# Patient Record
Sex: FEMALE | Race: WHITE | NOT HISPANIC OR LATINO | Employment: FULL TIME | URBAN - METROPOLITAN AREA
[De-identification: names, ages, dates, MRNs, and addresses within clinical notes are randomized per-mention and may not be internally consistent; named-entity substitution may affect disease eponyms.]

---

## 2018-05-07 ENCOUNTER — APPOINTMENT (OUTPATIENT)
Dept: CT IMAGING | Facility: HOSPITAL | Age: 38
End: 2018-05-07

## 2018-05-07 ENCOUNTER — HOSPITAL ENCOUNTER (EMERGENCY)
Facility: HOSPITAL | Age: 38
Discharge: HOME | End: 2018-05-07
Attending: EMERGENCY MEDICINE | Admitting: EMERGENCY MEDICINE

## 2018-05-07 VITALS
HEART RATE: 64 BPM | RESPIRATION RATE: 18 BRPM | OXYGEN SATURATION: 97 % | HEIGHT: 66 IN | SYSTOLIC BLOOD PRESSURE: 121 MMHG | DIASTOLIC BLOOD PRESSURE: 99 MMHG | WEIGHT: 200 LBS | BODY MASS INDEX: 32.14 KG/M2 | TEMPERATURE: 98.2 F

## 2018-05-07 DIAGNOSIS — R10.10 PAIN OF UPPER ABDOMEN: Primary | ICD-10-CM

## 2018-05-07 DIAGNOSIS — R10.31 RIGHT LOWER QUADRANT ABDOMINAL PAIN: ICD-10-CM

## 2018-05-07 LAB
ALBUMIN SERPL-MCNC: 4.2 G/DL (ref 3.2–4.8)
ALBUMIN/GLOB SERPL: 1.5 G/DL (ref 1.5–2.5)
ALP SERPL-CCNC: 71 U/L (ref 25–100)
ALT SERPL W P-5'-P-CCNC: 16 U/L (ref 7–40)
ANION GAP SERPL CALCULATED.3IONS-SCNC: 9 MMOL/L (ref 3–11)
AST SERPL-CCNC: 15 U/L (ref 0–33)
B-HCG UR QL: NEGATIVE
BASOPHILS # BLD AUTO: 0.04 10*3/MM3 (ref 0–0.2)
BASOPHILS NFR BLD AUTO: 0.4 % (ref 0–1)
BILIRUB SERPL-MCNC: 0.3 MG/DL (ref 0.3–1.2)
BILIRUB UR QL STRIP: NEGATIVE
BUN BLD-MCNC: 14 MG/DL (ref 9–23)
BUN/CREAT SERPL: 15.6 (ref 7–25)
CALCIUM SPEC-SCNC: 9.5 MG/DL (ref 8.7–10.4)
CHLORIDE SERPL-SCNC: 106 MMOL/L (ref 99–109)
CLARITY UR: CLEAR
CO2 SERPL-SCNC: 22 MMOL/L (ref 20–31)
COLOR UR: YELLOW
CREAT BLD-MCNC: 0.9 MG/DL (ref 0.6–1.3)
DEPRECATED RDW RBC AUTO: 44 FL (ref 37–54)
EOSINOPHIL # BLD AUTO: 0.19 10*3/MM3 (ref 0–0.3)
EOSINOPHIL NFR BLD AUTO: 2 % (ref 0–3)
ERYTHROCYTE [DISTWIDTH] IN BLOOD BY AUTOMATED COUNT: 13.6 % (ref 11.3–14.5)
GFR SERPL CREATININE-BSD FRML MDRD: 71 ML/MIN/1.73
GLOBULIN UR ELPH-MCNC: 2.8 GM/DL
GLUCOSE BLD-MCNC: 105 MG/DL (ref 70–100)
GLUCOSE UR STRIP-MCNC: NEGATIVE MG/DL
HCT VFR BLD AUTO: 38 % (ref 34.5–44)
HGB BLD-MCNC: 12.3 G/DL (ref 11.5–15.5)
HGB UR QL STRIP.AUTO: NEGATIVE
HOLD SPECIMEN: NORMAL
HOLD SPECIMEN: NORMAL
IMM GRANULOCYTES # BLD: 0.01 10*3/MM3 (ref 0–0.03)
IMM GRANULOCYTES NFR BLD: 0.1 % (ref 0–0.6)
INTERNAL NEGATIVE CONTROL: NEGATIVE
INTERNAL POSITIVE CONTROL: POSITIVE
KETONES UR QL STRIP: NEGATIVE
LEUKOCYTE ESTERASE UR QL STRIP.AUTO: NEGATIVE
LIPASE SERPL-CCNC: 38 U/L (ref 6–51)
LYMPHOCYTES # BLD AUTO: 2.25 10*3/MM3 (ref 0.6–4.8)
LYMPHOCYTES NFR BLD AUTO: 23.7 % (ref 24–44)
Lab: NORMAL
MCH RBC QN AUTO: 28.7 PG (ref 27–31)
MCHC RBC AUTO-ENTMCNC: 32.4 G/DL (ref 32–36)
MCV RBC AUTO: 88.6 FL (ref 80–99)
MONOCYTES # BLD AUTO: 0.76 10*3/MM3 (ref 0–1)
MONOCYTES NFR BLD AUTO: 8 % (ref 0–12)
NEUTROPHILS # BLD AUTO: 6.24 10*3/MM3 (ref 1.5–8.3)
NEUTROPHILS NFR BLD AUTO: 65.9 % (ref 41–71)
NITRITE UR QL STRIP: NEGATIVE
PH UR STRIP.AUTO: 5.5 [PH] (ref 5–8)
PLATELET # BLD AUTO: 318 10*3/MM3 (ref 150–450)
PMV BLD AUTO: 11.4 FL (ref 6–12)
POTASSIUM BLD-SCNC: 3.5 MMOL/L (ref 3.5–5.5)
PROT SERPL-MCNC: 7 G/DL (ref 5.7–8.2)
PROT UR QL STRIP: NEGATIVE
RBC # BLD AUTO: 4.29 10*6/MM3 (ref 3.89–5.14)
SODIUM BLD-SCNC: 137 MMOL/L (ref 132–146)
SP GR UR STRIP: 1.01 (ref 1–1.03)
UROBILINOGEN UR QL STRIP: NORMAL
WBC NRBC COR # BLD: 9.48 10*3/MM3 (ref 3.5–10.8)
WHOLE BLOOD HOLD SPECIMEN: NORMAL
WHOLE BLOOD HOLD SPECIMEN: NORMAL

## 2018-05-07 PROCEDURE — 74177 CT ABD & PELVIS W/CONTRAST: CPT

## 2018-05-07 PROCEDURE — 80306 DRUG TEST PRSMV INSTRMNT: CPT | Performed by: INTERNAL MEDICINE

## 2018-05-07 PROCEDURE — 96374 THER/PROPH/DIAG INJ IV PUSH: CPT

## 2018-05-07 PROCEDURE — 81003 URINALYSIS AUTO W/O SCOPE: CPT | Performed by: EMERGENCY MEDICINE

## 2018-05-07 PROCEDURE — 96361 HYDRATE IV INFUSION ADD-ON: CPT

## 2018-05-07 PROCEDURE — 83690 ASSAY OF LIPASE: CPT | Performed by: EMERGENCY MEDICINE

## 2018-05-07 PROCEDURE — 96376 TX/PRO/DX INJ SAME DRUG ADON: CPT

## 2018-05-07 PROCEDURE — 99284 EMERGENCY DEPT VISIT MOD MDM: CPT

## 2018-05-07 PROCEDURE — 80053 COMPREHEN METABOLIC PANEL: CPT | Performed by: EMERGENCY MEDICINE

## 2018-05-07 PROCEDURE — 96375 TX/PRO/DX INJ NEW DRUG ADDON: CPT

## 2018-05-07 PROCEDURE — 25010000002 IOPAMIDOL 61 % SOLUTION: Performed by: EMERGENCY MEDICINE

## 2018-05-07 PROCEDURE — 25010000002 FENTANYL CITRATE (PF) 100 MCG/2ML SOLUTION: Performed by: EMERGENCY MEDICINE

## 2018-05-07 PROCEDURE — 25010000002 ONDANSETRON PER 1 MG: Performed by: EMERGENCY MEDICINE

## 2018-05-07 PROCEDURE — 85025 COMPLETE CBC W/AUTO DIFF WBC: CPT | Performed by: EMERGENCY MEDICINE

## 2018-05-07 RX ORDER — OMEPRAZOLE 40 MG/1
40 CAPSULE, DELAYED RELEASE ORAL DAILY
Qty: 25 CAPSULE | Refills: 0 | Status: SHIPPED | OUTPATIENT
Start: 2018-05-07 | End: 2018-05-12 | Stop reason: HOSPADM

## 2018-05-07 RX ORDER — ONDANSETRON 4 MG/1
4 TABLET, FILM COATED ORAL EVERY 6 HOURS PRN
Qty: 8 TABLET | Refills: 0 | Status: SHIPPED | OUTPATIENT
Start: 2018-05-07 | End: 2018-05-16 | Stop reason: SDUPTHER

## 2018-05-07 RX ORDER — ALUMINA, MAGNESIA, AND SIMETHICONE 2400; 2400; 240 MG/30ML; MG/30ML; MG/30ML
15 SUSPENSION ORAL ONCE
Status: COMPLETED | OUTPATIENT
Start: 2018-05-07 | End: 2018-05-07

## 2018-05-07 RX ORDER — FENTANYL CITRATE 50 UG/ML
50 INJECTION, SOLUTION INTRAMUSCULAR; INTRAVENOUS ONCE
Status: COMPLETED | OUTPATIENT
Start: 2018-05-07 | End: 2018-05-07

## 2018-05-07 RX ORDER — FAMOTIDINE 40 MG/1
40 TABLET, FILM COATED ORAL DAILY
Qty: 10 TABLET | Refills: 0 | Status: SHIPPED | OUTPATIENT
Start: 2018-05-07 | End: 2018-05-12 | Stop reason: HOSPADM

## 2018-05-07 RX ORDER — HYDROXYZINE PAMOATE 25 MG/1
50 CAPSULE ORAL NIGHTLY
COMMUNITY

## 2018-05-07 RX ORDER — ONDANSETRON 2 MG/ML
4 INJECTION INTRAMUSCULAR; INTRAVENOUS ONCE
Status: COMPLETED | OUTPATIENT
Start: 2018-05-07 | End: 2018-05-07

## 2018-05-07 RX ORDER — TOPIRAMATE 100 MG/1
200 TABLET, FILM COATED ORAL NIGHTLY
COMMUNITY

## 2018-05-07 RX ORDER — SODIUM CHLORIDE 0.9 % (FLUSH) 0.9 %
10 SYRINGE (ML) INJECTION AS NEEDED
Status: DISCONTINUED | OUTPATIENT
Start: 2018-05-07 | End: 2018-05-07 | Stop reason: HOSPADM

## 2018-05-07 RX ORDER — SUCRALFATE 1 G/1
1 TABLET ORAL 4 TIMES DAILY
Qty: 30 TABLET | Refills: 0 | Status: SHIPPED | OUTPATIENT
Start: 2018-05-07 | End: 2018-05-12 | Stop reason: HOSPADM

## 2018-05-07 RX ADMIN — ALUMINUM HYDROXIDE, MAGNESIUM HYDROXIDE, AND DIMETHICONE 15 ML: 400; 400; 40 SUSPENSION ORAL at 18:35

## 2018-05-07 RX ADMIN — IOPAMIDOL 95 ML: 612 INJECTION, SOLUTION INTRAVENOUS at 17:40

## 2018-05-07 RX ADMIN — FENTANYL CITRATE 50 MCG: 50 INJECTION, SOLUTION INTRAMUSCULAR; INTRAVENOUS at 18:34

## 2018-05-07 RX ADMIN — SODIUM CHLORIDE 1000 ML: 9 INJECTION, SOLUTION INTRAVENOUS at 18:32

## 2018-05-07 RX ADMIN — LIDOCAINE HYDROCHLORIDE 15 ML: 20 SOLUTION ORAL; TOPICAL at 18:35

## 2018-05-07 RX ADMIN — FENTANYL CITRATE 50 MCG: 50 INJECTION, SOLUTION INTRAMUSCULAR; INTRAVENOUS at 19:45

## 2018-05-07 RX ADMIN — ONDANSETRON 4 MG: 2 INJECTION INTRAMUSCULAR; INTRAVENOUS at 18:34

## 2018-05-07 NOTE — DISCHARGE INSTRUCTIONS
Discontinue use of Ibuprofen and other NSAIDs.      Return to the Emergency Department if symptoms worsen or other concerns arise.    Please call ASAP to arrange outpatient follow up with one of the following gastroenterologists:    Jorge A Acevedo MD or Isak Perez MD  [address]  [place] [address] [phone]    Or    If unable to make a timely appointment with Dr. Acevedo or Dr. Perez, please follow up with one of these gastroenterologists:    Mario Hernandez MD or Tanner Suazo MD  [address] [facility]  [id number]  [place] [address] [phone]    If follow up with these specialists cannot be arranged soon, please also follow up with a primary care provider, next available.

## 2018-05-07 NOTE — ED PROVIDER NOTES
Subjective   Nadia Houston is a 38 y.o.female with a history of cholecystectomy who presents to the ED with c/o abdominal pain. Three days ago the patient developed nausea, which has gradually worsened since onset. Two days ago she developed diffuse right sided abdominal pain. The pain has remained on her right side and is greatly exacerbated by eating, deep inspiration or movement. Today it is more severe, prompting her to present to the ED for evaluation. At the ED she denies vomiting, fever, diarrhea or any other acute symptoms.        History provided by:  Patient  Abdominal Pain   Pain location:  RUQ  Pain radiates to:  Does not radiate  Onset quality:  Gradual  Duration:  3 days  Timing:  Constant  Progression:  Worsening  Chronicity:  New  Relieved by:  None tried  Worsened by:  Eating, movement and deep breathing  Ineffective treatments:  None tried  Associated symptoms: nausea    Associated symptoms: no chest pain, no chills, no constipation, no cough, no diarrhea, no dysuria, no fever, no shortness of breath and no vomiting        Review of Systems   Constitutional: Negative for chills and fever.   Respiratory: Negative for cough and shortness of breath.    Cardiovascular: Negative for chest pain.   Gastrointestinal: Positive for abdominal pain and nausea. Negative for constipation, diarrhea and vomiting.   Genitourinary: Negative for dysuria.   All other systems reviewed and are negative.      Past Medical History:   Diagnosis Date   • Migraine        No Known Allergies    Past Surgical History:   Procedure Laterality Date   •  SECTION     • CHOLECYSTECTOMY     • KNEE SURGERY Left        History reviewed. No pertinent family history.    Social History     Social History   • Marital status: Single     Social History Main Topics   • Smoking status: Never Smoker   • Smokeless tobacco: Never Used   • Alcohol use Yes      Comment: socially   • Drug use: No   • Sexual activity: Defer     Other  Topics Concern   • Not on file         Objective   Physical Exam   Constitutional: She is oriented to person, place, and time. She appears well-developed and well-nourished. No distress.   HENT:   Head: Normocephalic and atraumatic.   Mouth/Throat: No oropharyngeal exudate.   Eyes: Conjunctivae are normal. No scleral icterus.   Neck: Normal range of motion. Neck supple. No JVD present.   Cardiovascular: Normal rate, regular rhythm and normal heart sounds.  Exam reveals no gallop and no friction rub.    No murmur heard.  Pulmonary/Chest: Effort normal and breath sounds normal. No respiratory distress. She has no wheezes. She has no rales.   Abdominal: Soft. Bowel sounds are normal. She exhibits no distension. There is tenderness. There is no rebound and no guarding.   Moderate RLQ TTP. Minimal epigastric TTP.   Musculoskeletal: Normal range of motion. She exhibits no edema.   Neurological: She is alert and oriented to person, place, and time.   Skin: Skin is warm and dry. She is not diaphoretic.   Psychiatric: She has a normal mood and affect. Her behavior is normal.   Nursing note and vitals reviewed.      Procedures         ED Course  ED Course   Comment By Time   Dr. Levi re-evaluated the patient. She is feeling much improved. Will refer her to GI for possible peptic ulcer versus gastritis. Sourav Allyn 05/07 1843     Recent Results (from the past 24 hour(s))   CBC Auto Differential    Collection Time: 05/07/18  5:02 PM   Result Value Ref Range    WBC 9.48 3.50 - 10.80 10*3/mm3    RBC 4.29 3.89 - 5.14 10*6/mm3    Hemoglobin 12.3 11.5 - 15.5 g/dL    Hematocrit 38.0 34.5 - 44.0 %    MCV 88.6 80.0 - 99.0 fL    MCH 28.7 27.0 - 31.0 pg    MCHC 32.4 32.0 - 36.0 g/dL    RDW 13.6 11.3 - 14.5 %    RDW-SD 44.0 37.0 - 54.0 fl    MPV 11.4 6.0 - 12.0 fL    Platelets 318 150 - 450 10*3/mm3    Neutrophil % 65.9 41.0 - 71.0 %    Lymphocyte % 23.7 (L) 24.0 - 44.0 %    Monocyte % 8.0 0.0 - 12.0 %    Eosinophil % 2.0 0.0 -  "3.0 %    Basophil % 0.4 0.0 - 1.0 %    Immature Grans % 0.1 0.0 - 0.6 %    Neutrophils, Absolute 6.24 1.50 - 8.30 10*3/mm3    Lymphocytes, Absolute 2.25 0.60 - 4.80 10*3/mm3    Monocytes, Absolute 0.76 0.00 - 1.00 10*3/mm3    Eosinophils, Absolute 0.19 0.00 - 0.30 10*3/mm3    Basophils, Absolute 0.04 0.00 - 0.20 10*3/mm3    Immature Grans, Absolute 0.01 0.00 - 0.03 10*3/mm3   Urinalysis With / Culture If Indicated - Urine, Clean Catch    Collection Time: 05/07/18  5:04 PM   Result Value Ref Range    Color, UA Yellow Yellow, Straw    Appearance, UA Clear Clear    pH, UA 5.5 5.0 - 8.0    Specific Gravity, UA 1.011 1.001 - 1.030    Glucose, UA Negative Negative    Ketones, UA Negative Negative    Bilirubin, UA Negative Negative    Blood, UA Negative Negative    Protein, UA Negative Negative    Leuk Esterase, UA Negative Negative    Nitrite, UA Negative Negative    Urobilinogen, UA 0.2 E.U./dL 0.2 - 1.0 E.U./dL   POCT pregnancy, urine    Collection Time: 05/07/18  5:08 PM   Result Value Ref Range    HCG, Urine, QL Negative Negative    Lot Number [id number]     Internal Positive Control Positive     Internal Negative Control Negative      Note: In addition to lab results from this visit, the labs listed above may include labs taken at another facility or during a different encounter within the last 24 hours. Please correlate lab times with ED admission and discharge times for further clarification of the services performed during this visit.    CT Abdomen Pelvis With Contrast    (Results Pending)     Vitals:    05/07/18 1642   BP: 139/86   BP Location: Left arm   Patient Position: Sitting   Pulse: 60   Resp: 18   Temp: 98.2 °F (36.8 °C)   TempSrc: Oral   SpO2: 100%   Weight: 90.7 kg (200 lb)   Height: 167.6 cm (66\")     Medications   sodium chloride 0.9 % flush 10 mL (not administered)   aluminum-magnesium hydroxide-simethicone (MAALOX MAX) 400-400-40 MG/5ML suspension 15 mL (not administered)   lidocaine viscous " (XYLOCAINE) 2 % mouth solution 15 mL (not administered)   fentaNYL citrate (PF) (SUBLIMAZE) injection 50 mcg (not administered)   ondansetron (ZOFRAN) injection 4 mg (not administered)   sodium chloride 0.9 % bolus 1,000 mL (not administered)   iopamidol (ISOVUE-300) 61 % injection 100 mL (95 mL Intravenous Given 5/7/18 1740)     ECG/EMG Results (last 24 hours)     ** No results found for the last 24 hours. **                    [facility]    Final diagnoses:   Pain of upper abdomen   Right lower quadrant abdominal pain       Documentation assistance provided by debi Gruber.  Information recorded by the scribe was done at my direction and has been verified and validated by me.     Sourav Gruber  05/07/18 2035       Ernie Levi DO  05/07/18 7101

## 2018-05-10 ENCOUNTER — APPOINTMENT (OUTPATIENT)
Dept: GENERAL RADIOLOGY | Facility: HOSPITAL | Age: 38
End: 2018-05-10

## 2018-05-10 ENCOUNTER — HOSPITAL ENCOUNTER (OUTPATIENT)
Facility: HOSPITAL | Age: 38
Discharge: HOME | End: 2018-05-12
Attending: INTERNAL MEDICINE | Admitting: HOSPITALIST

## 2018-05-10 ENCOUNTER — OFFICE VISIT (OUTPATIENT)
Dept: GASTROENTEROLOGY | Facility: CLINIC | Age: 38
End: 2018-05-10

## 2018-05-10 VITALS
DIASTOLIC BLOOD PRESSURE: 90 MMHG | SYSTOLIC BLOOD PRESSURE: 130 MMHG | HEIGHT: 66 IN | HEART RATE: 67 BPM | OXYGEN SATURATION: 99 % | WEIGHT: 202.4 LBS | TEMPERATURE: 99 F | BODY MASS INDEX: 32.53 KG/M2

## 2018-05-10 DIAGNOSIS — R10.11 RIGHT UPPER QUADRANT ABDOMINAL PAIN: ICD-10-CM

## 2018-05-10 DIAGNOSIS — R10.10 PAIN OF UPPER ABDOMEN: ICD-10-CM

## 2018-05-10 DIAGNOSIS — R11.0 NAUSEA: Primary | ICD-10-CM

## 2018-05-10 DIAGNOSIS — R11.2 NAUSEA & VOMITING: ICD-10-CM

## 2018-05-10 DIAGNOSIS — R11.0 NAUSEA: ICD-10-CM

## 2018-05-10 DIAGNOSIS — R10.13 EPIGASTRIC PAIN: Primary | ICD-10-CM

## 2018-05-10 PROBLEM — Z86.69 HX OF MIGRAINES: Status: ACTIVE | Noted: 2018-05-10

## 2018-05-10 PROBLEM — R10.9 ABDOMINAL PAIN: Status: ACTIVE | Noted: 2018-05-10

## 2018-05-10 LAB
ALBUMIN SERPL-MCNC: 3.9 G/DL (ref 3.2–4.8)
ALBUMIN/GLOB SERPL: 1.4 G/DL (ref 1.5–2.5)
ALP SERPL-CCNC: 63 U/L (ref 25–100)
ALT SERPL W P-5'-P-CCNC: 16 U/L (ref 7–40)
AMPHET+METHAMPHET UR QL: NEGATIVE
AMPHETAMINES UR QL: NEGATIVE
AMYLASE SERPL-CCNC: 43 U/L (ref 30–118)
ANION GAP SERPL CALCULATED.3IONS-SCNC: 10 MMOL/L (ref 3–11)
AST SERPL-CCNC: 21 U/L (ref 0–33)
BARBITURATES UR QL SCN: NEGATIVE
BASOPHILS # BLD AUTO: 0.03 10*3/MM3 (ref 0–0.2)
BASOPHILS NFR BLD AUTO: 0.3 % (ref 0–1)
BENZODIAZ UR QL SCN: NEGATIVE
BILIRUB SERPL-MCNC: 0.4 MG/DL (ref 0.3–1.2)
BUN BLD-MCNC: 10 MG/DL (ref 9–23)
BUN/CREAT SERPL: 11.1 (ref 7–25)
BUPRENORPHINE SERPL-MCNC: NEGATIVE NG/ML
CALCIUM SPEC-SCNC: 9.1 MG/DL (ref 8.7–10.4)
CANNABINOIDS SERPL QL: NEGATIVE
CHLORIDE SERPL-SCNC: 111 MMOL/L (ref 99–109)
CO2 SERPL-SCNC: 19 MMOL/L (ref 20–31)
COCAINE UR QL: NEGATIVE
CREAT BLD-MCNC: 0.9 MG/DL (ref 0.6–1.3)
D-LACTATE SERPL-SCNC: 1.2 MMOL/L (ref 0.5–2)
DEPRECATED RDW RBC AUTO: 44.5 FL (ref 37–54)
EOSINOPHIL # BLD AUTO: 0.13 10*3/MM3 (ref 0–0.3)
EOSINOPHIL NFR BLD AUTO: 1.5 % (ref 0–3)
ERYTHROCYTE [DISTWIDTH] IN BLOOD BY AUTOMATED COUNT: 13.4 % (ref 11.3–14.5)
GFR SERPL CREATININE-BSD FRML MDRD: 71 ML/MIN/1.73
GLOBULIN UR ELPH-MCNC: 2.8 GM/DL
GLUCOSE BLD-MCNC: 79 MG/DL (ref 70–100)
HCT VFR BLD AUTO: 39.5 % (ref 34.5–44)
HGB BLD-MCNC: 12.3 G/DL (ref 11.5–15.5)
IMM GRANULOCYTES # BLD: 0.01 10*3/MM3 (ref 0–0.03)
IMM GRANULOCYTES NFR BLD: 0.1 % (ref 0–0.6)
LIPASE SERPL-CCNC: 29 U/L (ref 6–51)
LYMPHOCYTES # BLD AUTO: 2.46 10*3/MM3 (ref 0.6–4.8)
LYMPHOCYTES NFR BLD AUTO: 27.8 % (ref 24–44)
MAGNESIUM SERPL-MCNC: 2.1 MG/DL (ref 1.3–2.7)
MCH RBC QN AUTO: 28.5 PG (ref 27–31)
MCHC RBC AUTO-ENTMCNC: 31.1 G/DL (ref 32–36)
MCV RBC AUTO: 91.4 FL (ref 80–99)
METHADONE UR QL SCN: NEGATIVE
MONOCYTES # BLD AUTO: 0.73 10*3/MM3 (ref 0–1)
MONOCYTES NFR BLD AUTO: 8.2 % (ref 0–12)
NEUTROPHILS # BLD AUTO: 5.51 10*3/MM3 (ref 1.5–8.3)
NEUTROPHILS NFR BLD AUTO: 62.2 % (ref 41–71)
OPIATES UR QL: NEGATIVE
OXYCODONE UR QL SCN: NEGATIVE
PCP UR QL SCN: NEGATIVE
PHOSPHATE SERPL-MCNC: 3.6 MG/DL (ref 2.4–5.1)
PLATELET # BLD AUTO: 281 10*3/MM3 (ref 150–450)
PMV BLD AUTO: 11.2 FL (ref 6–12)
POTASSIUM BLD-SCNC: 3.9 MMOL/L (ref 3.5–5.5)
PROPOXYPH UR QL: NEGATIVE
PROT SERPL-MCNC: 6.7 G/DL (ref 5.7–8.2)
RBC # BLD AUTO: 4.32 10*6/MM3 (ref 3.89–5.14)
SODIUM BLD-SCNC: 140 MMOL/L (ref 132–146)
TRICYCLICS UR QL SCN: NEGATIVE
WBC NRBC COR # BLD: 8.86 10*3/MM3 (ref 3.5–10.8)

## 2018-05-10 PROCEDURE — 96361 HYDRATE IV INFUSION ADD-ON: CPT

## 2018-05-10 PROCEDURE — 81025 URINE PREGNANCY TEST: CPT | Performed by: NURSE PRACTITIONER

## 2018-05-10 PROCEDURE — 74018 RADEX ABDOMEN 1 VIEW: CPT

## 2018-05-10 PROCEDURE — 99204 OFFICE O/P NEW MOD 45 MIN: CPT | Performed by: INTERNAL MEDICINE

## 2018-05-10 PROCEDURE — 82150 ASSAY OF AMYLASE: CPT | Performed by: NURSE PRACTITIONER

## 2018-05-10 PROCEDURE — 25010000002 HYDROMORPHONE PER 4 MG: Performed by: INTERNAL MEDICINE

## 2018-05-10 PROCEDURE — 83735 ASSAY OF MAGNESIUM: CPT | Performed by: INTERNAL MEDICINE

## 2018-05-10 PROCEDURE — 85025 COMPLETE CBC W/AUTO DIFF WBC: CPT | Performed by: INTERNAL MEDICINE

## 2018-05-10 PROCEDURE — 80053 COMPREHEN METABOLIC PANEL: CPT | Performed by: INTERNAL MEDICINE

## 2018-05-10 PROCEDURE — 96374 THER/PROPH/DIAG INJ IV PUSH: CPT

## 2018-05-10 PROCEDURE — 96376 TX/PRO/DX INJ SAME DRUG ADON: CPT

## 2018-05-10 PROCEDURE — 81003 URINALYSIS AUTO W/O SCOPE: CPT | Performed by: INTERNAL MEDICINE

## 2018-05-10 PROCEDURE — 83605 ASSAY OF LACTIC ACID: CPT | Performed by: INTERNAL MEDICINE

## 2018-05-10 PROCEDURE — 25810000003 SODIUM CHLORIDE 0.9 % WITH KCL 20 MEQ 20-0.9 MEQ/L-% SOLUTION: Performed by: NURSE PRACTITIONER

## 2018-05-10 PROCEDURE — 25010000002 MORPHINE SULFATE (PF) 2 MG/ML SOLUTION: Performed by: INTERNAL MEDICINE

## 2018-05-10 PROCEDURE — 25010000002 ONDANSETRON PER 1 MG: Performed by: NURSE PRACTITIONER

## 2018-05-10 PROCEDURE — 25010000002 MORPHINE PER 10 MG: Performed by: INTERNAL MEDICINE

## 2018-05-10 PROCEDURE — G0378 HOSPITAL OBSERVATION PER HR: HCPCS

## 2018-05-10 PROCEDURE — 99220 PR INITIAL OBSERVATION CARE/DAY 70 MINUTES: CPT | Performed by: INTERNAL MEDICINE

## 2018-05-10 PROCEDURE — 84100 ASSAY OF PHOSPHORUS: CPT | Performed by: INTERNAL MEDICINE

## 2018-05-10 PROCEDURE — 83690 ASSAY OF LIPASE: CPT | Performed by: NURSE PRACTITIONER

## 2018-05-10 PROCEDURE — 96375 TX/PRO/DX INJ NEW DRUG ADDON: CPT

## 2018-05-10 PROCEDURE — G0379 DIRECT REFER HOSPITAL OBSERV: HCPCS

## 2018-05-10 RX ORDER — PANTOPRAZOLE SODIUM 40 MG/1
40 TABLET, DELAYED RELEASE ORAL
Status: DISCONTINUED | OUTPATIENT
Start: 2018-05-10 | End: 2018-05-10

## 2018-05-10 RX ORDER — SODIUM CHLORIDE AND POTASSIUM CHLORIDE 150; 900 MG/100ML; MG/100ML
100 INJECTION, SOLUTION INTRAVENOUS CONTINUOUS
Status: DISCONTINUED | OUTPATIENT
Start: 2018-05-10 | End: 2018-05-10

## 2018-05-10 RX ORDER — ALUMINA, MAGNESIA, AND SIMETHICONE 2400; 2400; 240 MG/30ML; MG/30ML; MG/30ML
15 SUSPENSION ORAL EVERY 6 HOURS PRN
Status: DISCONTINUED | OUTPATIENT
Start: 2018-05-10 | End: 2018-05-12 | Stop reason: HOSPADM

## 2018-05-10 RX ORDER — HYDROCODONE BITARTRATE AND ACETAMINOPHEN 5; 325 MG/1; MG/1
1 TABLET ORAL EVERY 6 HOURS PRN
Status: DISCONTINUED | OUTPATIENT
Start: 2018-05-10 | End: 2018-05-11

## 2018-05-10 RX ORDER — MORPHINE SULFATE 2 MG/ML
1 INJECTION, SOLUTION INTRAMUSCULAR; INTRAVENOUS
Status: DISCONTINUED | OUTPATIENT
Start: 2018-05-10 | End: 2018-05-11

## 2018-05-10 RX ORDER — HYDROMORPHONE HYDROCHLORIDE 1 MG/ML
0.5 INJECTION, SOLUTION INTRAMUSCULAR; INTRAVENOUS; SUBCUTANEOUS
Status: DISCONTINUED | OUTPATIENT
Start: 2018-05-10 | End: 2018-05-10

## 2018-05-10 RX ORDER — PANTOPRAZOLE SODIUM 40 MG/1
40 TABLET, DELAYED RELEASE ORAL 2 TIMES DAILY
Status: DISCONTINUED | OUTPATIENT
Start: 2018-05-10 | End: 2018-05-12

## 2018-05-10 RX ORDER — ONDANSETRON 4 MG/1
4 TABLET, FILM COATED ORAL EVERY 6 HOURS PRN
Status: DISCONTINUED | OUTPATIENT
Start: 2018-05-10 | End: 2018-05-12 | Stop reason: HOSPADM

## 2018-05-10 RX ORDER — ACETAMINOPHEN 325 MG/1
650 TABLET ORAL EVERY 4 HOURS PRN
Status: DISCONTINUED | OUTPATIENT
Start: 2018-05-10 | End: 2018-05-12 | Stop reason: HOSPADM

## 2018-05-10 RX ORDER — SODIUM CHLORIDE 0.9 % (FLUSH) 0.9 %
1-10 SYRINGE (ML) INJECTION AS NEEDED
Status: DISCONTINUED | OUTPATIENT
Start: 2018-05-10 | End: 2018-05-12 | Stop reason: HOSPADM

## 2018-05-10 RX ORDER — ONDANSETRON 2 MG/ML
4 INJECTION INTRAMUSCULAR; INTRAVENOUS EVERY 6 HOURS PRN
Status: DISCONTINUED | OUTPATIENT
Start: 2018-05-10 | End: 2018-05-12 | Stop reason: HOSPADM

## 2018-05-10 RX ORDER — SUCRALFATE 1 G/1
1 TABLET ORAL 4 TIMES DAILY
Status: DISCONTINUED | OUTPATIENT
Start: 2018-05-10 | End: 2018-05-11

## 2018-05-10 RX ORDER — FAMOTIDINE 20 MG/1
20 TABLET, FILM COATED ORAL 2 TIMES DAILY
Status: DISCONTINUED | OUTPATIENT
Start: 2018-05-10 | End: 2018-05-10

## 2018-05-10 RX ORDER — TOPIRAMATE 100 MG/1
100 TABLET, FILM COATED ORAL DAILY
Status: DISCONTINUED | OUTPATIENT
Start: 2018-05-11 | End: 2018-05-12 | Stop reason: HOSPADM

## 2018-05-10 RX ADMIN — ONDANSETRON 4 MG: 2 INJECTION INTRAMUSCULAR; INTRAVENOUS at 17:49

## 2018-05-10 RX ADMIN — PANTOPRAZOLE SODIUM 40 MG: 40 TABLET, DELAYED RELEASE ORAL at 16:23

## 2018-05-10 RX ADMIN — PANTOPRAZOLE SODIUM 40 MG: 40 TABLET, DELAYED RELEASE ORAL at 21:32

## 2018-05-10 RX ADMIN — HYDROCODONE BITARTRATE AND ACETAMINOPHEN 1 TABLET: 5; 325 TABLET ORAL at 21:32

## 2018-05-10 RX ADMIN — POTASSIUM CHLORIDE AND SODIUM CHLORIDE 100 ML/HR: 900; 150 INJECTION, SOLUTION INTRAVENOUS at 16:24

## 2018-05-10 RX ADMIN — ONDANSETRON 4 MG: 2 INJECTION INTRAMUSCULAR; INTRAVENOUS at 23:47

## 2018-05-10 RX ADMIN — HYDROMORPHONE HYDROCHLORIDE 0.5 MG: 1 INJECTION, SOLUTION INTRAMUSCULAR; INTRAVENOUS; SUBCUTANEOUS at 16:24

## 2018-05-10 RX ADMIN — MORPHINE SULFATE 1 MG: 10 INJECTION INTRAVENOUS at 21:07

## 2018-05-10 RX ADMIN — SUCRALFATE 1 G: 1 TABLET ORAL at 21:32

## 2018-05-10 RX ADMIN — MORPHINE SULFATE 1 MG: 10 INJECTION INTRAVENOUS at 18:17

## 2018-05-10 NOTE — PROGRESS NOTES
PCP: No Known Provider    Chief Complaint   Patient presents with   • Bleeding Ulcers       History of Present Illness:   HPI   Ms. Houston is a 38-year-old with a history of migraine headaches previous cholecystectomy.  The patient was in the emergency room on Monday with abdominal pain and nausea.  She was given a prescription for Prilosec, Pepcid and Zofran but there has been no relief.  She states the nausea is more intense and is present for most of the day.  The patient has severe upper center abdominal pain that also involves the right upper quadrant.  She states the pain is stabbing but is sometimes crampy.  There is increased intensity of the discomfort when she is mobile.  The patient denies any fever or chills.  Ms. Houston states that she has not consumed any solid food in several days due to the nausea and pain.  The patient also states her liquid intake has been very limited.  She did have one formed bowel movement the other day and this was without blood.  She does not take nonsteroidal medications on a regular basis.  She had a cholecystectomy years ago and did have gallstones.  She states this discomfort is more intense than the pain she had at the time of the gallbladder problem.  Ms. Houston denies any recent weight loss.  There is no history of travel outside the [place] or camping.  The patient is a  but is unaware of any known ill contacts.  Past Medical History:   Diagnosis Date   • Cholelithiasis    • Migraine        Past Surgical History:   Procedure Laterality Date   •  SECTION     • CHOLECYSTECTOMY     • KNEE SURGERY Left    • TONSILLECTOMY AND ADENOIDECTOMY           Current Outpatient Prescriptions:   •  famotidine (PEPCID) 40 MG tablet, Take 1 tablet by mouth Daily., Disp: 10 tablet, Rfl: 0  •  hydrOXYzine (VISTARIL) 50 MG capsule, Take 50 mg by mouth 2 (Two) Times a Day., Disp: , Rfl:   •  omeprazole (priLOSEC) 40 MG capsule, Take 1 capsule by mouth  Daily., Disp: 25 capsule, Rfl: 0  •  ondansetron (ZOFRAN) 4 MG tablet, Take 1 tablet by mouth Every 6 (Six) Hours As Needed for Nausea or Vomiting., Disp: 8 tablet, Rfl: 0  •  sucralfate (CARAFATE) 1 g tablet, Take 1 tablet by mouth 4 (Four) Times a Day., Disp: 30 tablet, Rfl: 0  •  topiramate (TOPAMAX) 100 MG tablet, Take 100 mg by mouth Daily., Disp: , Rfl:     No Known Allergies    Family History   Problem Relation Age of Onset   • Family history unknown: Yes       Social History     Social History   • Marital status: Single     Spouse name: N/A   • Number of children: N/A   • Years of education: N/A     Occupational History   • Not on file.     Social History Main Topics   • Smoking status: Never Smoker   • Smokeless tobacco: Never Used   • Alcohol use Yes      Comment: socially   • Drug use: No   • Sexual activity: Defer     Other Topics Concern   • Not on file     Social History Narrative   • No narrative on file       Review of Systems   Constitutional: Positive for activity change and appetite change. Negative for fatigue, fever and unexpected weight change.   HENT: Positive for trouble swallowing. Negative for dental problem, hearing loss, mouth sores, postnasal drip, sneezing and voice change.    Eyes: Negative for pain, redness, itching and visual disturbance.   Respiratory: Negative for cough, choking, chest tightness, shortness of breath and wheezing.    Cardiovascular: Negative for chest pain, palpitations and leg swelling.   Gastrointestinal: Positive for abdominal pain, nausea and vomiting. Negative for abdominal distention, anal bleeding, blood in stool, constipation, diarrhea and rectal pain.   Endocrine: Negative for cold intolerance, heat intolerance, polydipsia, polyphagia and polyuria.   Genitourinary: Negative.  Negative for dysuria, enuresis, flank pain, frequency, hematuria and urgency.   Musculoskeletal: Positive for gait problem. Negative for arthralgias, back pain, joint swelling and  myalgias.   Skin: Negative for color change, pallor and rash.   Allergic/Immunologic: Negative for environmental allergies, food allergies and immunocompromised state.   Neurological: Positive for headaches. Negative for dizziness, tremors, seizures, facial asymmetry, speech difficulty and numbness.   Hematological: Negative for adenopathy.   Psychiatric/Behavioral: Negative for behavioral problems, confusion, dysphoric mood, hallucinations and self-injury.       Vitals:    05/10/18 1249   BP: 130/90   Pulse: 67   Temp: 99 °F (37.2 °C)   SpO2: 99%       Physical Exam   Constitutional: She is oriented to person, place, and time.   Uncomfortable and ill appearing   HENT:   Head: Normocephalic and atraumatic.   Mouth/Throat: Oropharynx is clear and moist. No oropharyngeal exudate.   Dry mucosa   Eyes: EOM are normal. No scleral icterus.   Neck: Neck supple. No thyromegaly present.   Cardiovascular: Normal rate, regular rhythm and normal heart sounds.  Exam reveals no gallop.    No murmur heard.  Pulmonary/Chest: Effort normal and breath sounds normal. She has no wheezes. She has no rales.   Abdominal: Soft. Bowel sounds are normal. Tenderness: epigastrium, right upper quadrant. There is guarding. There is no rebound.   Musculoskeletal: Normal range of motion. She exhibits no edema or tenderness.   Lymphadenopathy:     She has no cervical adenopathy.   Neurological: She is alert and oriented to person, place, and time. She exhibits normal muscle tone.   Skin: No rash noted. No erythema.   Psychiatric: She has a normal mood and affect. Her behavior is normal. Thought content normal.   Vitals reviewed.      Nadia was seen today for bleeding ulcers.    Diagnoses and all orders for this visit:    Epigastric pain    Right upper quadrant abdominal pain    Nausea    The etiology for the abdominal pain and nausea is unclear.  She has not improved with proton pump inhibitor and H2 blocker therapy.  The differential still  includes peptic ulcer disease, pancreatitis, common bile duct stone and bowel ischemia.      Plan: Discussed with the patient that given her lack of improvement as an outpatient will admit to the hospital for further evaluation.      I spent time with the patient discussing the plan and reason for admission.

## 2018-05-10 NOTE — H&P
"    [facility] Medicine Services  HISTORY AND PHYSICAL    Patient Name: Nadia Houston  : 1980  MRN: [id number]  Primary Care Physician: No Known Provider    Subjective   Subjective     Chief Complaint:  Abdominal pain    HPI:  Nadia Houston is a 38 y.o. female PMH migraines, cholecystectomy,  who Who presents as a direct admission from Dr. Acevedo's office due to abdominal pain.  Patient is a healthy .  On Saturday she developed nausea and abdominal pain (epigastric).  Pain continued to worsen in severity and she presented to the emergency department on Monday.  She had a CT scan of her abdomen and pelvis at that time that was normal.  Incidentally small umbilical and periumbilical hernia was noted containing mesenteric fat only.  She is given Pepcid, Protonix and Zofran and sent home.  No relief from these medications.  Pain has continued to worsen.  Pain is constant.  Worsened by swallowing liquids or food.  When she swallows she feels like there is something pushing the liquid and food back of her throat.  Denies any burning.  Reports dry heaving..  She feels like her upper portion of her abdomen is swollen and \"there is something in there that needs to come out\".  She saw Dr. Acevedo today he recommended direct admission.    Review of Systems   Gen- No fevers, chills  CV- No chest pain, palpitations  Resp- No cough, dyspnea    Otherwise 10-system ROS reviewed and is negative except as mentioned in the HPI.    Personal History     Past Medical History:   Diagnosis Date   • Cholelithiasis    • Migraine        Past Surgical History:   Procedure Laterality Date   •  SECTION     • CHOLECYSTECTOMY     • KNEE SURGERY Left    • TONSILLECTOMY AND ADENOIDECTOMY         Family History: family history includes Multiple sclerosis in her mother.     Social History:  reports that she has never smoked. She has never used smokeless tobacco. She " reports that she drinks alcohol. She reports that she does not use drugs.  Social History     Social History Narrative    Works as a  at [employer], is independent of ADL's       Medications:  Prescriptions Prior to Admission   Medication Sig Dispense Refill Last Dose   • famotidine (PEPCID) 40 MG tablet Take 1 tablet by mouth Daily. 10 tablet 0 5/10/2018 at Unknown time   • hydrOXYzine (VISTARIL) 50 MG capsule Take 100 mg by mouth Every Night.   5/9/2018 at Unknown time   • omeprazole (priLOSEC) 40 MG capsule Take 1 capsule by mouth Daily. 25 capsule 0 5/10/2018 at Unknown time   • ondansetron (ZOFRAN) 4 MG tablet Take 1 tablet by mouth Every 6 (Six) Hours As Needed for Nausea or Vomiting. 8 tablet 0 5/10/2018 at Unknown time   • sucralfate (CARAFATE) 1 g tablet Take 1 tablet by mouth 4 (Four) Times a Day. 30 tablet 0 5/10/2018 at Unknown time   • topiramate (TOPAMAX) 100 MG tablet Take 100 mg by mouth Daily.   5/10/2018 at Unknown time       No Known Allergies    Objective   Objective     Vital Signs:   Temp:  [98 °F (36.7 °C)-99 °F (37.2 °C)] 98 °F (36.7 °C)  Heart Rate:  [63-67] 67  Resp:  [18] 18  BP: (126-131)/(81-90) 131/81        Physical Exam   Constitutional: No acute distress, awake, alert  Eyes: PERRLA, sclerae anicteric, no conjunctival injection  HENT: NCAT, mucous membranes dry  Neck: Supple, trachea midline  Respiratory: Clear to auscultation bilaterally, nonlabored respirations   Cardiovascular: RRR, no murmurs, rubs, or gallops, palpable pedal pulses bilaterally  Gastrointestinal: + bowel sounds, + guarding,   Musculoskeletal: No bilateral ankle edema, no clubbing or cyanosis to extremities  Psychiatric: Appropriate affect, cooperative  Neurologic: Oriented x 3, strength symmetric in all extremities, speech clear  Skin: No rashes    Results Reviewed:  Labs and imagining from 5/7 reviewed         Assessment/Plan   Assessment / Plan     Hospital Problem List     Abdominal  pain    Hx of migraines          Assessment & Plan:  --stat labs, KUB, GI consult, sounds like she needs an EGD  --IVF, pain medication, H2 blocker, PPI, Maalox prn,  --clears for now, NPO after MN in anticipation of EGD    DVT prophylaxis: SCDS/TEDS    CODE STATUS:  Full Code    Admission Status:  I believe this patient meets OBSERVATION status, however if further evaluation or treatment plans warrant, status may change.  Based upon current information, I predict patient's care encounter to be less than or equal to 2 midnights.      Electronically signed by SAVAGE Avila, 05/10/18, 3:34 PM.  Patient seen and examined at the bedside.  Patient is a 38-year-old  female with past mental history significant for acid reflux, migraine headache.  Patient recently has had abdominal pain and was seen on the seventh of this month at the emergency room.  A CT scan of the abdomen and pelvis was done which was basically nonrevealing.  Patient was sent home with Zofran, Protonix and sulfate.  Patient also was given a follow-up appointment with Dr. Acevedo.  Patient was seen today at the office of Dr. Acevedo and he recommended direct admission as patient had complaint of abdominal pain.  On physical exam patient was resting in bed in no acute distress complaining of pain.  She had been given Dilaudid 1 mg once but she was asking for more IV pain medication.  Patient also was given a shot of morphine but she told the nurse that the pain did not touch her.  Pupils are equally reactive to light and accommodation.  Neck was supple and there was no lymphadenopathy present.  Bleeding was normal and lungs are clear to auscultation bilaterally.  S1 and S2 are present and no murmur gallop or rub audible.  Abdomen was soft, nontender, not distended, bowel sounds are present.  No edema lower extremities are no jaundice.  Present.    Assessment and plan:  * Abdominal pain.  Etiology is uncertain CT scan is nonrevealing.   Patient asked for pain medication very frequently which is out of proportion with the findings on physical exam in my opinion.  We will admit the patient and monitor.  We will ask GI to see the patient in the morning and we recommend that an upper endoscopy be done.  I discussed the findings and plan of care in detail with the patient at the bedside.  Please see the above for more details.  Patient will be admitted for observation at this point.

## 2018-05-10 NOTE — PLAN OF CARE
Problem: Patient Care Overview  Goal: Plan of Care Review  Outcome: Ongoing (interventions implemented as appropriate)   05/10/18 1263   Coping/Psychosocial   Plan of Care Reviewed With patient   OTHER   Outcome Summary VSS, complaints of abdominal pain and decreased appetite, IV pain meds and fluids initiated.

## 2018-05-11 ENCOUNTER — ANESTHESIA EVENT (OUTPATIENT)
Dept: GASTROENTEROLOGY | Facility: HOSPITAL | Age: 38
End: 2018-05-11

## 2018-05-11 ENCOUNTER — ANESTHESIA (OUTPATIENT)
Dept: GASTROENTEROLOGY | Facility: HOSPITAL | Age: 38
End: 2018-05-11

## 2018-05-11 LAB
ALBUMIN SERPL-MCNC: 3.7 G/DL (ref 3.2–4.8)
ALBUMIN/GLOB SERPL: 1.5 G/DL (ref 1.5–2.5)
ALP SERPL-CCNC: 60 U/L (ref 25–100)
ALT SERPL W P-5'-P-CCNC: 16 U/L (ref 7–40)
ANION GAP SERPL CALCULATED.3IONS-SCNC: 4 MMOL/L (ref 3–11)
AST SERPL-CCNC: 17 U/L (ref 0–33)
B-HCG UR QL: NEGATIVE
BASOPHILS # BLD AUTO: 0.02 10*3/MM3 (ref 0–0.2)
BASOPHILS NFR BLD AUTO: 0.3 % (ref 0–1)
BILIRUB SERPL-MCNC: 0.5 MG/DL (ref 0.3–1.2)
BILIRUB UR QL STRIP: NEGATIVE
BUN BLD-MCNC: 10 MG/DL (ref 9–23)
BUN/CREAT SERPL: 12.5 (ref 7–25)
CALCIUM SPEC-SCNC: 8.6 MG/DL (ref 8.7–10.4)
CHLORIDE SERPL-SCNC: 113 MMOL/L (ref 99–109)
CLARITY UR: CLEAR
CO2 SERPL-SCNC: 23 MMOL/L (ref 20–31)
COLOR UR: YELLOW
CREAT BLD-MCNC: 0.8 MG/DL (ref 0.6–1.3)
DEPRECATED RDW RBC AUTO: 43.3 FL (ref 37–54)
EOSINOPHIL # BLD AUTO: 0.23 10*3/MM3 (ref 0–0.3)
EOSINOPHIL NFR BLD AUTO: 3.9 % (ref 0–3)
ERYTHROCYTE [DISTWIDTH] IN BLOOD BY AUTOMATED COUNT: 13.1 % (ref 11.3–14.5)
GFR SERPL CREATININE-BSD FRML MDRD: 81 ML/MIN/1.73
GLOBULIN UR ELPH-MCNC: 2.4 GM/DL
GLUCOSE BLD-MCNC: 76 MG/DL (ref 70–100)
GLUCOSE UR STRIP-MCNC: NEGATIVE MG/DL
HCT VFR BLD AUTO: 36.3 % (ref 34.5–44)
HGB BLD-MCNC: 11.5 G/DL (ref 11.5–15.5)
HGB UR QL STRIP.AUTO: NEGATIVE
IMM GRANULOCYTES # BLD: 0.01 10*3/MM3 (ref 0–0.03)
IMM GRANULOCYTES NFR BLD: 0.2 % (ref 0–0.6)
KETONES UR QL STRIP: ABNORMAL
LEUKOCYTE ESTERASE UR QL STRIP.AUTO: NEGATIVE
LYMPHOCYTES # BLD AUTO: 2.25 10*3/MM3 (ref 0.6–4.8)
LYMPHOCYTES NFR BLD AUTO: 38.4 % (ref 24–44)
MCH RBC QN AUTO: 28.5 PG (ref 27–31)
MCHC RBC AUTO-ENTMCNC: 31.7 G/DL (ref 32–36)
MCV RBC AUTO: 90.1 FL (ref 80–99)
MONOCYTES # BLD AUTO: 0.65 10*3/MM3 (ref 0–1)
MONOCYTES NFR BLD AUTO: 11.1 % (ref 0–12)
NEUTROPHILS # BLD AUTO: 2.7 10*3/MM3 (ref 1.5–8.3)
NEUTROPHILS NFR BLD AUTO: 46.1 % (ref 41–71)
NITRITE UR QL STRIP: NEGATIVE
PH UR STRIP.AUTO: <=5 [PH] (ref 5–8)
PLATELET # BLD AUTO: 266 10*3/MM3 (ref 150–450)
PMV BLD AUTO: 11.4 FL (ref 6–12)
POTASSIUM BLD-SCNC: 3.5 MMOL/L (ref 3.5–5.5)
PROT SERPL-MCNC: 6.1 G/DL (ref 5.7–8.2)
PROT UR QL STRIP: NEGATIVE
RBC # BLD AUTO: 4.03 10*6/MM3 (ref 3.89–5.14)
SODIUM BLD-SCNC: 140 MMOL/L (ref 132–146)
SP GR UR STRIP: 1.01 (ref 1–1.03)
UROBILINOGEN UR QL STRIP: ABNORMAL
WBC NRBC COR # BLD: 5.86 10*3/MM3 (ref 3.5–10.8)

## 2018-05-11 PROCEDURE — 25010000002 MORPHINE SULFATE (PF) 2 MG/ML SOLUTION: Performed by: INTERNAL MEDICINE

## 2018-05-11 PROCEDURE — G0378 HOSPITAL OBSERVATION PER HR: HCPCS

## 2018-05-11 PROCEDURE — 25010000002 PROPOFOL 1000 MG/ML EMULSION: Performed by: NURSE ANESTHETIST, CERTIFIED REGISTERED

## 2018-05-11 PROCEDURE — 85025 COMPLETE CBC W/AUTO DIFF WBC: CPT | Performed by: NURSE PRACTITIONER

## 2018-05-11 PROCEDURE — 25010000002 METOCLOPRAMIDE PER 10 MG: Performed by: INTERNAL MEDICINE

## 2018-05-11 PROCEDURE — 99226 PR SBSQ OBSERVATION CARE/DAY 35 MINUTES: CPT | Performed by: HOSPITALIST

## 2018-05-11 PROCEDURE — 25010000002 ONDANSETRON PER 1 MG: Performed by: NURSE PRACTITIONER

## 2018-05-11 PROCEDURE — 25010000002 MORPHINE SULFATE (PF) 2 MG/ML SOLUTION: Performed by: HOSPITALIST

## 2018-05-11 PROCEDURE — 88305 TISSUE EXAM BY PATHOLOGIST: CPT | Performed by: INTERNAL MEDICINE

## 2018-05-11 PROCEDURE — 99244 OFF/OP CNSLTJ NEW/EST MOD 40: CPT | Performed by: NURSE PRACTITIONER

## 2018-05-11 PROCEDURE — 80053 COMPREHEN METABOLIC PANEL: CPT | Performed by: NURSE PRACTITIONER

## 2018-05-11 PROCEDURE — 96376 TX/PRO/DX INJ SAME DRUG ADON: CPT

## 2018-05-11 PROCEDURE — 25010000002 HEPARIN (PORCINE) PER 1000 UNITS: Performed by: HOSPITALIST

## 2018-05-11 PROCEDURE — 25010000002 PROPOFOL 10 MG/ML EMULSION: Performed by: NURSE ANESTHETIST, CERTIFIED REGISTERED

## 2018-05-11 RX ORDER — LIDOCAINE HYDROCHLORIDE 10 MG/ML
INJECTION, SOLUTION EPIDURAL; INFILTRATION; INTRACAUDAL; PERINEURAL AS NEEDED
Status: DISCONTINUED | OUTPATIENT
Start: 2018-05-11 | End: 2018-05-11 | Stop reason: SURG

## 2018-05-11 RX ORDER — FAMOTIDINE 20 MG/1
20 TABLET, FILM COATED ORAL ONCE
Status: DISCONTINUED | OUTPATIENT
Start: 2018-05-11 | End: 2018-05-11 | Stop reason: HOSPADM

## 2018-05-11 RX ORDER — LIDOCAINE HYDROCHLORIDE 10 MG/ML
0.5 INJECTION, SOLUTION EPIDURAL; INFILTRATION; INTRACAUDAL; PERINEURAL ONCE AS NEEDED
Status: DISCONTINUED | OUTPATIENT
Start: 2018-05-11 | End: 2018-05-11 | Stop reason: HOSPADM

## 2018-05-11 RX ORDER — HYDROXYZINE HYDROCHLORIDE 25 MG/1
100 TABLET, FILM COATED ORAL NIGHTLY
Status: DISCONTINUED | OUTPATIENT
Start: 2018-05-11 | End: 2018-05-12 | Stop reason: HOSPADM

## 2018-05-11 RX ORDER — SODIUM CHLORIDE 0.9 % (FLUSH) 0.9 %
1-10 SYRINGE (ML) INJECTION AS NEEDED
Status: DISCONTINUED | OUTPATIENT
Start: 2018-05-11 | End: 2018-05-11 | Stop reason: HOSPADM

## 2018-05-11 RX ORDER — MORPHINE SULFATE 2 MG/ML
2 INJECTION, SOLUTION INTRAMUSCULAR; INTRAVENOUS
Status: DISCONTINUED | OUTPATIENT
Start: 2018-05-11 | End: 2018-05-12

## 2018-05-11 RX ORDER — SODIUM CHLORIDE 9 MG/ML
75 INJECTION, SOLUTION INTRAVENOUS CONTINUOUS
Status: DISCONTINUED | OUTPATIENT
Start: 2018-05-11 | End: 2018-05-12 | Stop reason: HOSPADM

## 2018-05-11 RX ORDER — PROPOFOL 10 MG/ML
VIAL (ML) INTRAVENOUS AS NEEDED
Status: DISCONTINUED | OUTPATIENT
Start: 2018-05-11 | End: 2018-05-11 | Stop reason: SURG

## 2018-05-11 RX ORDER — METOCLOPRAMIDE HYDROCHLORIDE 5 MG/ML
10 INJECTION INTRAMUSCULAR; INTRAVENOUS EVERY 6 HOURS
Status: DISCONTINUED | OUTPATIENT
Start: 2018-05-11 | End: 2018-05-12 | Stop reason: HOSPADM

## 2018-05-11 RX ORDER — SODIUM CHLORIDE, SODIUM LACTATE, POTASSIUM CHLORIDE, CALCIUM CHLORIDE 600; 310; 30; 20 MG/100ML; MG/100ML; MG/100ML; MG/100ML
9 INJECTION, SOLUTION INTRAVENOUS CONTINUOUS
Status: DISCONTINUED | OUTPATIENT
Start: 2018-05-11 | End: 2018-05-11

## 2018-05-11 RX ORDER — HYDROCODONE BITARTRATE AND ACETAMINOPHEN 5; 325 MG/1; MG/1
1 TABLET ORAL EVERY 4 HOURS PRN
Status: DISCONTINUED | OUTPATIENT
Start: 2018-05-11 | End: 2018-05-12

## 2018-05-11 RX ORDER — MORPHINE SULFATE 2 MG/ML
1 INJECTION, SOLUTION INTRAMUSCULAR; INTRAVENOUS EVERY 6 HOURS PRN
Status: DISCONTINUED | OUTPATIENT
Start: 2018-05-11 | End: 2018-05-11

## 2018-05-11 RX ORDER — HEPARIN SODIUM 5000 [USP'U]/ML
5000 INJECTION, SOLUTION INTRAVENOUS; SUBCUTANEOUS EVERY 8 HOURS SCHEDULED
Status: DISCONTINUED | OUTPATIENT
Start: 2018-05-11 | End: 2018-05-12 | Stop reason: HOSPADM

## 2018-05-11 RX ADMIN — MORPHINE SULFATE 1 MG: 10 INJECTION INTRAVENOUS at 14:55

## 2018-05-11 RX ADMIN — HYDROCODONE BITARTRATE AND ACETAMINOPHEN 1 TABLET: 5; 325 TABLET ORAL at 12:51

## 2018-05-11 RX ADMIN — ONDANSETRON 4 MG: 2 INJECTION INTRAMUSCULAR; INTRAVENOUS at 08:08

## 2018-05-11 RX ADMIN — PROPOFOL 80 MG: 10 INJECTION, EMULSION INTRAVENOUS at 11:14

## 2018-05-11 RX ADMIN — HEPARIN SODIUM 5000 UNITS: 5000 INJECTION, SOLUTION INTRAVENOUS; SUBCUTANEOUS at 21:38

## 2018-05-11 RX ADMIN — MORPHINE SULFATE 1 MG: 10 INJECTION INTRAVENOUS at 06:06

## 2018-05-11 RX ADMIN — HYDROCODONE BITARTRATE AND ACETAMINOPHEN 1 TABLET: 5; 325 TABLET ORAL at 23:58

## 2018-05-11 RX ADMIN — PROPOFOL 220 MCG/KG/MIN: 10 INJECTION, EMULSION INTRAVENOUS at 11:15

## 2018-05-11 RX ADMIN — MORPHINE SULFATE 2 MG: 10 INJECTION INTRAVENOUS at 21:37

## 2018-05-11 RX ADMIN — METOCLOPRAMIDE 10 MG: 5 INJECTION, SOLUTION INTRAMUSCULAR; INTRAVENOUS at 21:39

## 2018-05-11 RX ADMIN — SUCRALFATE 1 G: 1 TABLET ORAL at 08:08

## 2018-05-11 RX ADMIN — METOCLOPRAMIDE 10 MG: 5 INJECTION, SOLUTION INTRAMUSCULAR; INTRAVENOUS at 14:55

## 2018-05-11 RX ADMIN — PANTOPRAZOLE SODIUM 40 MG: 40 TABLET, DELAYED RELEASE ORAL at 08:08

## 2018-05-11 RX ADMIN — ACETAMINOPHEN 650 MG: 325 TABLET, FILM COATED ORAL at 01:19

## 2018-05-11 RX ADMIN — MORPHINE SULFATE 2 MG: 10 INJECTION INTRAVENOUS at 18:20

## 2018-05-11 RX ADMIN — ONDANSETRON 4 MG: 2 INJECTION INTRAMUSCULAR; INTRAVENOUS at 14:55

## 2018-05-11 RX ADMIN — PANTOPRAZOLE SODIUM 40 MG: 40 TABLET, DELAYED RELEASE ORAL at 21:38

## 2018-05-11 RX ADMIN — SODIUM CHLORIDE, POTASSIUM CHLORIDE, SODIUM LACTATE AND CALCIUM CHLORIDE: 600; 310; 30; 20 INJECTION, SOLUTION INTRAVENOUS at 11:13

## 2018-05-11 RX ADMIN — SODIUM CHLORIDE, POTASSIUM CHLORIDE, SODIUM LACTATE AND CALCIUM CHLORIDE 9 ML/HR: 600; 310; 30; 20 INJECTION, SOLUTION INTRAVENOUS at 10:23

## 2018-05-11 RX ADMIN — HYDROXYZINE HYDROCHLORIDE 100 MG: 25 TABLET, FILM COATED ORAL at 21:39

## 2018-05-11 RX ADMIN — TOPIRAMATE 100 MG: 100 TABLET, FILM COATED ORAL at 08:08

## 2018-05-11 RX ADMIN — HYDROCODONE BITARTRATE AND ACETAMINOPHEN 1 TABLET: 5; 325 TABLET ORAL at 08:08

## 2018-05-11 RX ADMIN — ONDANSETRON 4 MG: 2 INJECTION INTRAMUSCULAR; INTRAVENOUS at 22:34

## 2018-05-11 RX ADMIN — LIDOCAINE HYDROCHLORIDE 100 MG: 10 INJECTION, SOLUTION EPIDURAL; INFILTRATION; INTRACAUDAL; PERINEURAL at 11:14

## 2018-05-11 RX ADMIN — HYDROCODONE BITARTRATE AND ACETAMINOPHEN 1 TABLET: 5; 325 TABLET ORAL at 02:36

## 2018-05-11 RX ADMIN — SODIUM CHLORIDE 75 ML/HR: 9 INJECTION, SOLUTION INTRAVENOUS at 18:20

## 2018-05-11 RX ADMIN — MORPHINE SULFATE 1 MG: 10 INJECTION INTRAVENOUS at 00:22

## 2018-05-11 NOTE — BRIEF OP NOTE
ESOPHAGOGASTRODUODENOSCOPY  Progress Note    Nadia Correiatcher  5/10/2018 - 5/11/2018    EGD shows erosive gastritis in antrum. There are several white pills in the stomach. Poor gastric motility is noted.    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    >> Await H pylori biopsies.  >> Daily PPI  >> Trial Reglan Mark I. Brunner, MD     Date: 5/11/2018  Time: 11:31 AM

## 2018-05-11 NOTE — ANESTHESIA POSTPROCEDURE EVALUATION
Patient: Nadia Houston    Procedure Summary     Date:  05/11/18 Room / Location:   [facility]    Anesthesia Start:  1114 Anesthesia Stop:      Procedure:  ESOPHAGOGASTRODUODENOSCOPY (N/A Esophagus) Diagnosis:      Surgeon:  Mark I Brunner, MD Provider:  Jorge A Mcneal MD    Anesthesia Type:  general ASA Status:  2          Anesthesia Type: general  Last vitals  /74   Temp 97   Pulse 65   Resp 20   SpO2 96     Post Anesthesia Care and Evaluation    Patient location during evaluation: PACU  Patient participation: complete - patient participated  Level of consciousness: awake and alert  Pain score: 0  Pain management: adequate  Airway patency: patent  Anesthetic complications: No anesthetic complications  PONV Status: none  Cardiovascular status: hemodynamically stable and acceptable  Respiratory status: nonlabored ventilation, acceptable and nasal cannula  Hydration status: acceptable

## 2018-05-11 NOTE — ANESTHESIA PREPROCEDURE EVALUATION
Anesthesia Evaluation     Patient summary reviewed and Nursing notes reviewed   no history of anesthetic complications:  NPO Solid Status: > 8 hours  NPO Liquid Status: > 8 hours           Airway   Mallampati: II  TM distance: >3 FB  Neck ROM: full  No difficulty expected  Dental - normal exam     Pulmonary - negative pulmonary ROS and normal exam    breath sounds clear to auscultation  Cardiovascular - negative cardio ROS and normal exam    Rhythm: regular  Rate: normal        Neuro/Psych- negative ROS  GI/Hepatic/Renal/Endo    (+) obesity,       ROS Comment: Epigastric pain    Musculoskeletal (-) negative ROS    Abdominal    Substance History - negative use     OB/GYN          Other - negative ROS                       Anesthesia Plan    ASA 2     general     intravenous induction   Anesthetic plan and risks discussed with patient.    Plan discussed with CRNA.

## 2018-05-11 NOTE — PROGRESS NOTES
[facility] Medicine Services  PROGRESS NOTE    Patient Name: Nadia Houston  : 1980  MRN: [id number]    Date of Admission: 5/10/2018  Length of Stay: 0  Primary Care Physician: No Known Provider    Subjective   Subjective     CC:  Direct admission from GI clinic for abdominal pain    HPI:  Seems to feel the pain medication is not strong enough.  No family.  Says she thinks this is gastroparesis.  Had EGD today.    Review of Systems  Gen- No fevers, chills  CV- No chest pain, palpitations  Resp- No cough, dyspnea  GI- No N/V/D, abd pain    Otherwise ROS is negative except as mentioned in the HPI.    Objective   Objective     Vital Signs:   Temp:  [97.4 °F (36.3 °C)-98.3 °F (36.8 °C)] 98.3 °F (36.8 °C)  Heart Rate:  [56-80] 60  Resp:  [17-22] 18  BP: (103-128)/(61-84) 123/74     Physical Exam:    Constitutional: No acute distress, awake, alert  HENT: NCAT, dry tongue  Respiratory: Clear to auscultation bilaterally, respiratory effort normal   Cardiovascular: RRR, no murmurs, rubs, or gallops, palpable pedal pulses bilaterally  Gastrointestinal: Positive bowel sounds, soft, + epigastric tenderness  Musculoskeletal: No bilateral ankle edema  Psychiatric: Appropriate affect, cooperative  Neurologic: Oriented x 3, strength symmetric in all extremities, Cranial Nerves grossly intact to confrontation, speech clear  Skin: No rashes      Results Reviewed:  I have personally reviewed current lab, radiology, and data and agree.      Results from last 7 days  Lab Units 18  0559 05/10/18  1534 18  1702   WBC 10*3/mm3 5.86 8.86 9.48   HEMOGLOBIN g/dL 11.5 12.3 12.3   HEMATOCRIT % 36.3 39.5 38.0   PLATELETS 10*3/mm3 266 281 318       Results from last 7 days  Lab Units 18  0559 05/10/18  1534 18  1702   SODIUM mmol/L 140 140 137   POTASSIUM mmol/L 3.5 3.9 3.5   CHLORIDE mmol/L 113* 111* 106   CO2 mmol/L 23.0 19.0* 22.0   BUN mg/dL 10 10 14   CREATININE mg/dL 0.80 0.90  0.90   GLUCOSE mg/dL 76 79 105*   CALCIUM mg/dL 8.6* 9.1 9.5   ALT (SGPT) U/L 16 16 16   AST (SGOT) U/L 17 21 15     Estimated Creatinine Clearance: 110.3 mL/min (by C-G formula based on SCr of 0.8 mg/dL).  No results found for: BNP  No results found for: PHART    Microbiology Results Abnormal     None          Imaging Results (last 24 hours)     Procedure Component Value Units Date/Time    XR Abdomen KUB [[id number]] Collected:  05/10/18 2052     Updated:  05/11/18 0829    Narrative:       EXAMINATION: XR ABDOMEN KUB-      INDICATION: Abdominal pain.      COMPARISON: None.     FINDINGS: Mildly shaped scoliosis is noted. Stool shadow is seen  throughout normal caliber colon. Some small bowel gas is seen, but in  normal-appearing loops. No bowel wall edema, pneumatosis or pathologic  intraabdominal mass effect is seen. Clips noted in the gallbladder  fossa. A small round calculus in the pelvis, 1 to 2 mm in diameter is  probably a phlebolith, but is in the general location of the distal  ureter.        Impression:       1. Nonobstructive bowel gas pattern.  2. Probable incidental phlebolith in the right pelvis. Please correlate  with any history of flank pain however.     D:  05/10/2018  E:  05/11/2018           I have reviewed the medications.    Assessment/Plan   Assessment / Plan     Hospital Problem List     Abdominal pain    Hx of migraines        Brief Hospital Course to date:  Nadia Houston is a 38 y.o. female who was a direct admission from GI clinic with abdominal pain.    CT imaging and EGD today unclear, but possible gastroparesis.    Assessment & Plan:    Abdominal Pain  - possible gastroparesis  - gastritis  - PPI/Reglan  Sleep disorder  - hydroxyzine 100 mg at bedtime  Gastroparesis  - Reglan IV  Pain  - increase IV morphine today    Asking for increase in pain mediation  Discussed the risk of slowing GI tract while trying to give pro motility agent  Increase dose and frequency of morphine  today.  Sounds like has had sleep disorder for years.    EGD today.    DVT Prophylaxis:      CODE STATUS: Full Code    Disposition: I expect the patient to be discharged TBD      Electronically signed by Dallas Albarran MD, 05/11/18, 6:35 PM.

## 2018-05-11 NOTE — PROGRESS NOTES
Continued Stay Note  [facility]     Patient Name: Nadia Houston  MRN: [id number]  Today's Date: 5/11/2018    Admit Date: 5/10/2018          Discharge Plan     Row Name 05/11/18 1445       Plan    Plan HOME    Patient/Family in Agreement with Plan yes    Plan Comments Met with pt at bedside.  She resides with her 12 y/o son in [facility].  She is independent with ADLs.  No current DME or HH services.  Confirmed she has [employer] insurance with Rx coverage.  She confirmed she has a PCP at [facility] in [place], however, cannot recall the name of the MD.  Goal is to return home upon DC.  No immediate needs identified/voiced.  CM will cont to follow.    Final Discharge Disposition Code 01 - home or self-care              Discharge Codes    No documentation.       Expected Discharge Date and Time     Expected Discharge Date Expected Discharge Time    May 13, 2018             Ansley Barker

## 2018-05-11 NOTE — PLAN OF CARE
Problem: Patient Care Overview  Goal: Plan of Care Review  Outcome: Ongoing (interventions implemented as appropriate)   05/11/18 5192   Coping/Psychosocial   Plan of Care Reviewed With patient   OTHER   Outcome Summary EGD found significant gastroparesis, reglan started. Morphine increased.    Coping/Psychosocial   Patient Agreement with Plan of Care agrees   Plan of Care Review   Progress improving

## 2018-05-11 NOTE — PLAN OF CARE
Problem: Patient Care Overview  Goal: Plan of Care Review  Outcome: Ongoing (interventions implemented as appropriate)   05/11/18 0312   Coping/Psychosocial   Plan of Care Reviewed With patient   OTHER   Outcome Summary Pt. made NPO at midnight possible EGD in am. Pt. c/o pain working on controling pain. Pt. c/o nausea see MAR controlled w/ medication. VSS, IV fluids d/c, & urine sample sent to lab.    Coping/Psychosocial   Patient Agreement with Plan of Care agrees   Plan of Care Review   Progress improving       Problem: Pain, Acute (Adult)  Goal: Identify Related Risk Factors and Signs and Symptoms  Outcome: Ongoing (interventions implemented as appropriate)   05/11/18 0312   Pain, Acute (Adult)   Related Risk Factors (Acute Pain) persistent pain   Signs and Symptoms (Acute Pain) guarding/abnormal posturing/positioning;verbalization of pain descriptors;sleep pattern alteration;nausea/vomiting/anorexia     Goal: Acceptable Pain Control/Comfort Level  Outcome: Ongoing (interventions implemented as appropriate)   05/11/18 0312   Pain, Acute (Adult)   Acceptable Pain Control/Comfort Level making progress toward outcome

## 2018-05-11 NOTE — PROGRESS NOTES
Discharge Planning Assessment  [facility]     Patient Name: Nadia Houston  MRN: [id number]  Today's Date: 5/11/2018    Admit Date: 5/10/2018          Discharge Needs Assessment     Row Name 05/11/18 1444       Living Environment    Lives With child(yessy), dependent    Current Living Arrangements home/apartment/condo    Primary Care Provided by self    Provides Primary Care For child(yessy)    Quality of Family Relationships unable to assess    Able to Return to Prior Arrangements yes       Transition Planning    Patient/Family Anticipates Transition to home    Patient/Family Anticipated Services at Transition none    Transportation Anticipated car, drives self       Discharge Needs Assessment    Readmission Within the Last 30 Days no previous admission in last 30 days    Concerns to be Addressed no discharge needs identified;denies needs/concerns at this time    Equipment Currently Used at Home none    Anticipated Changes Related to Illness none    Equipment Needed After Discharge none            Discharge Plan     Row Name 05/11/18 1445       Plan    Plan HOME    Patient/Family in Agreement with Plan yes    Plan Comments Met with pt at bedside.  She resides with her 14 y/o son in [facility].  She is independent with ADLs.  No current DME or HH services.  Confirmed she has [employer] insurance with Rx coverage.  She confirmed she has a PCP at [facility] in [place], however, cannot recall the name of the MD.  Goal is to return home upon DC.  No immediate needs identified/voiced.  CM will cont to follow.    Final Discharge Disposition Code 01 - home or self-care        Destination     No service coordination in this encounter.      Durable Medical Equipment     No service coordination in this encounter.      Dialysis/Infusion     No service coordination in this encounter.      Home Medical Care     No service coordination in this encounter.      Social Care     No service coordination in this encounter.         Expected Discharge Date and Time     Expected Discharge Date Expected Discharge Time    May 13, 2018               Demographic Summary     Row Name 05/11/18 1443       General Information    Admission Type observation    Referral Source admission list    Reason for Consult discharge planning    Preferred Language English       Contact Information    Permission Granted to Share Info With     Contact Information Obtained for             Functional Status     Row Name 05/11/18 1444       Functional Status    Usual Activity Tolerance good       Functional Status, IADL    Medications independent    Meal Preparation independent    Housekeeping independent    Laundry independent    Shopping independent            Psychosocial    No documentation.           Abuse/Neglect    No documentation.           Legal    No documentation.           Substance Abuse    No documentation.           Patient Forms    No documentation.         Ansley Barker

## 2018-05-11 NOTE — CONSULTS
"[facility]      Name:NADIA HOUSTON : 1980 MRN# [id number]  Date of Service: 18   Admitted 5/10/2018  1:50 PM  LOS: 0 days Room S583/1  Reason for Consultation: abd pain  CC: abd pain   Assessment/Plan                                             ASSESSMENT & PLANS   38 y.o. female who is admitted on 5/10/2018 for Abdominal pain [R10.9].  Active Problems:    Abdominal pain    Hx of migraines    Upper abd pain. Unclear etiology. Labs and CT normal. Seldom NSAIDS and occasional ETOH. Obese BMI 33  Nausea w/o vomiting  Hx of ccy   · NPO. EGD  · Continue hydration, PPI, and carafate        Subjective                                                     SUBJECTIVE     HPI:Nadia Houston is a 38 y.o. female PMH migraines, cholecystectomy,  who Who presents as a direct admission from Dr. Acevedo's office due to abdominal pain.   Pt developed new onset of nausea and abdominal pain (epigastric) on Saturday.    Pain continued to worsen in severity and she presented to the emergency department on .  She had a CT scan of her abdomen and pelvis at that time that was normal.  Incidentally small umbilical and periumbilical hernia was noted containing mesenteric fat only.  She is given Pepcid, Protonix and Zofran and sent home.      No relief from these medications.  Pain has continued to worsen.  She saw Dr. Acevedo yesterday and he recommended direct admission.     Reports of severe, constant upper center (mostly) and RUQ (sometimes) abdominal pain. Does not radiate to back. Worsened by swallowing liquids or food and moving.  When she swallows she feels like there is something pushing the liquid and food back of her throat.  Denies any burning or odynophagia.  Reports dry heaving and constant nausea.  She feels like her upper portion of her abdomen is swollen and \"there is something in there that needs to come out\".  Pain is stabbing but is sometimes crampy, feeling " like labor pain. The patient denies any fever or chills.  Ms. Houston states that she has not consumed any solid food in several days due to the nausea and pain.  The patient also states her liquid intake has been very limited.  She did have one formed bowel movement 3 days ago on Tues, but no bloody or abnormal stool.  She does not take nonsteroidal medications on a regular basis.  She had a cholecystectomy years ago and did have gallstones.  She states this discomfort is more intense than the pain she had at the time of the gallbladder problem.  Ms. Houston denies any recent weight loss.  There is no history of travel outside the [place] or camping.  The patient is a  but is unaware of any known ill contacts. Seldom NSAIDS, which she takes PRN for migraines. Reports that her migraines is mostly controlled by daily Topamax.  Seldom ETOH    Work up does not show anemia or dehydration    ROS: Complete 14-system ROS performed & documented w/ pertinent findings included in HPI.  All other sys are negative.  Subjective   PAST MED HX: Pt has a past medical history of Cholelithiasis and Migraine.  PAST SURG HX: Pt has a past surgical history that includes  section; Cholecystectomy; Knee surgery (Left); and Tonsillectomy and adenoidectomy.  FAM HX: family history includes Multiple sclerosis in her mother.  SOC HX: Pt reports that she has never smoked. She has never used smokeless tobacco. She reports that she drinks alcohol about 1 or 2 a wk. She reports that she does not use drugs. She is an . No recreational drug  Objective                                                           OBJECTIVE   Allergy: Pt has No Known Allergies.  Scheduled Meds  pantoprazole 40 mg Oral BID   sucralfate 1 g Oral 4x Daily   topiramate 100 mg Oral Daily     Infusions   PRN Meds•  acetaminophen  •  aluminum-magnesium hydroxide-simethicone  •  HYDROcodone-acetaminophen  •  Morphine  •   ondansetron  •  ondansetron  •  sodium chloride  Home Meds  Prescriptions Prior to Admission   Medication Sig Dispense Refill Last Dose   • famotidine (PEPCID) 40 MG tablet Take 1 tablet by mouth Daily. 10 tablet 0 5/10/2018 at Unknown time   • hydrOXYzine (VISTARIL) 50 MG capsule Take 100 mg by mouth Every Night.   5/9/2018 at Unknown time   • omeprazole (priLOSEC) 40 MG capsule Take 1 capsule by mouth Daily. 25 capsule 0 5/10/2018 at Unknown time   • ondansetron (ZOFRAN) 4 MG tablet Take 1 tablet by mouth Every 6 (Six) Hours As Needed for Nausea or Vomiting. 8 tablet 0 5/10/2018 at Unknown time   • sucralfate (CARAFATE) 1 g tablet Take 1 tablet by mouth 4 (Four) Times a Day. 30 tablet 0 5/10/2018 at Unknown time   • topiramate (TOPAMAX) 100 MG tablet Take 100 mg by mouth Daily.   5/10/2018 at Unknown time       Results from last 7 days  Lab Units 05/11/18  0559 05/10/18  1534 05/07/18  1702   HEMOGLOBIN g/dL 11.5 12.3 12.3   HEMATOCRIT % 36.3 39.5 38.0       Results from last 7 days  Lab Units 05/11/18  0559 05/10/18  1534 05/07/18  1702   WBC 10*3/mm3 5.86 8.86 9.48   MCV fL 90.1 91.4 88.6   PLATELETS 10*3/mm3 266 281 318   BUN / CREAT RATIO  12.5 11.1 15.6   LACTATE mmol/L  --  1.2  --    ANION GAP mmol/L 4.0 10.0 9.0       Results from last 7 days  Lab Units 05/11/18  0559 05/10/18  1534 05/07/18  1702   AST (SGOT) U/L 17 21 15   ALT (SGPT) U/L 16 16 16   ALK PHOS U/L 60 63 71   BILIRUBIN mg/dL 0.5 0.4 0.3   ALBUMIN g/dL 3.70 3.90 4.20   LIPASE U/L  --  29 38   AMYLASE U/L  --  43  --    BUN mg/dL 10 10 14   CREATININE mg/dL 0.80 0.90 0.90   EGFR IF NONAFRICN AM mL/min/1.73 81 71 71   SODIUM mmol/L 140 140 137   POTASSIUM mmol/L 3.5 3.9 3.5   MAGNESIUM mg/dL  --  2.1  --    CO2 mmol/L 23.0 19.0* 22.0   PHOSPHORUS mg/dL  --  3.6  --    GLUCOSE mg/dL 76 79 105*     urine drug screen negative    Temp  Min: 97.9 °F (36.6 °C)  Max: 99 °F (37.2 °C)   BP  Min: 103/64  Max: 131/81   Pulse  Min: 56  Max: 67   Resp  Min:  17  Max: 18   SpO2  Min: 96 %  Max: 99 %   Body mass index is 32.54 kg/m².  Physical Exam  General Well developed; well nourished; no acute distress.   ENT Oral mucosa pink and moist without thrush or lesions.    Neck Neck supple; trachea midline. No thyromegaly   Resp CTA; no rhonchi, rales, or wheezes.  Respiration effort normal  CV RRR; normal S1, S2; no M/R/G. No lower extremity edema  GI Abd soft, moderately TTP in midepigastric, some guarding, no rebound, ND, normal active bowel sounds. No abd hernia  Skin No rash; no lesions; no bruises.  Skin turgor normal  Musc No clubbing; no cyanosis.  Moving all extremities  Psych Oriented to time, place, and person.  Appropriate affect    Alexia Hoffman APRN  [phone]

## 2018-05-12 VITALS
DIASTOLIC BLOOD PRESSURE: 57 MMHG | HEIGHT: 66 IN | SYSTOLIC BLOOD PRESSURE: 100 MMHG | OXYGEN SATURATION: 97 % | BODY MASS INDEX: 32.14 KG/M2 | TEMPERATURE: 98.3 F | RESPIRATION RATE: 18 BRPM | WEIGHT: 200 LBS | HEART RATE: 74 BPM

## 2018-05-12 LAB
CYTO UR: NORMAL
LAB AP CASE REPORT: NORMAL
LAB AP CLINICAL INFORMATION: NORMAL
Lab: NORMAL
PATH REPORT.FINAL DX SPEC: NORMAL
PATH REPORT.GROSS SPEC: NORMAL

## 2018-05-12 PROCEDURE — 25010000002 ONDANSETRON PER 1 MG: Performed by: NURSE PRACTITIONER

## 2018-05-12 PROCEDURE — 25010000002 HEPARIN (PORCINE) PER 1000 UNITS: Performed by: HOSPITALIST

## 2018-05-12 PROCEDURE — 99213 OFFICE O/P EST LOW 20 MIN: CPT | Performed by: INTERNAL MEDICINE

## 2018-05-12 PROCEDURE — 99217 PR OBSERVATION CARE DISCHARGE MANAGEMENT: CPT | Performed by: HOSPITALIST

## 2018-05-12 PROCEDURE — G0378 HOSPITAL OBSERVATION PER HR: HCPCS

## 2018-05-12 PROCEDURE — 25010000002 MORPHINE SULFATE (PF) 2 MG/ML SOLUTION: Performed by: HOSPITALIST

## 2018-05-12 PROCEDURE — 25010000002 METOCLOPRAMIDE PER 10 MG: Performed by: INTERNAL MEDICINE

## 2018-05-12 RX ORDER — PANTOPRAZOLE SODIUM 40 MG/1
40 TABLET, DELAYED RELEASE ORAL
Status: DISCONTINUED | OUTPATIENT
Start: 2018-05-13 | End: 2018-05-12 | Stop reason: HOSPADM

## 2018-05-12 RX ORDER — METOCLOPRAMIDE 10 MG/1
10 TABLET ORAL 3 TIMES DAILY PRN
Qty: 42 TABLET | Refills: 0 | Status: SHIPPED | OUTPATIENT
Start: 2018-05-12 | End: 2018-05-26

## 2018-05-12 RX ORDER — METOCLOPRAMIDE 10 MG/1
10 TABLET ORAL 3 TIMES DAILY PRN
Status: DISCONTINUED | OUTPATIENT
Start: 2018-05-12 | End: 2018-05-12 | Stop reason: HOSPADM

## 2018-05-12 RX ORDER — PANTOPRAZOLE SODIUM 40 MG/1
40 TABLET, DELAYED RELEASE ORAL DAILY
Qty: 30 TABLET | Refills: 0 | Status: SHIPPED | OUTPATIENT
Start: 2018-05-12 | End: 2018-06-11

## 2018-05-12 RX ADMIN — HEPARIN SODIUM 5000 UNITS: 5000 INJECTION, SOLUTION INTRAVENOUS; SUBCUTANEOUS at 06:16

## 2018-05-12 RX ADMIN — METOCLOPRAMIDE 10 MG: 5 INJECTION, SOLUTION INTRAMUSCULAR; INTRAVENOUS at 14:40

## 2018-05-12 RX ADMIN — MORPHINE SULFATE 2 MG: 10 INJECTION INTRAVENOUS at 02:55

## 2018-05-12 RX ADMIN — TOPIRAMATE 100 MG: 100 TABLET, FILM COATED ORAL at 09:24

## 2018-05-12 RX ADMIN — ONDANSETRON 4 MG: 2 INJECTION INTRAMUSCULAR; INTRAVENOUS at 09:23

## 2018-05-12 RX ADMIN — METOCLOPRAMIDE 10 MG: 5 INJECTION, SOLUTION INTRAMUSCULAR; INTRAVENOUS at 09:23

## 2018-05-12 RX ADMIN — SODIUM CHLORIDE 75 ML/HR: 9 INJECTION, SOLUTION INTRAVENOUS at 07:35

## 2018-05-12 RX ADMIN — PANTOPRAZOLE SODIUM 40 MG: 40 TABLET, DELAYED RELEASE ORAL at 09:23

## 2018-05-12 RX ADMIN — MORPHINE SULFATE 2 MG: 10 INJECTION INTRAVENOUS at 09:23

## 2018-05-12 RX ADMIN — METOCLOPRAMIDE 10 MG: 5 INJECTION, SOLUTION INTRAMUSCULAR; INTRAVENOUS at 02:15

## 2018-05-12 NOTE — PROGRESS NOTES
"Adult Nutrition  Assessment/PES    Patient Name:  Nadia Houston  YOB: 1980  MRN: [id number]  Admit Date:  5/10/2018    Assessment Date:  5/12/2018    Comments:            Adult Nutrition Assessment     Row Name 05/12/18 1442       Anthropometrics    Height Method measured    Height 167.6 cm (66\")       Admit Weight    Admit Weight Method measured    Admit Weight 90.7 kg (200 lb)       Ideal Body Weight (IBW)    Ideal Body Weight (IBW) (kg) 59.58       IBW Adjustment, Para/Tetraplegia    5% Adjustment, Para (IBW) 56.6    10% Adjustment, Para (IBW) 53.62    10% Adjustment, Tetra (IBW) 53.62    15% Adjustment, Tetra (IBW) 50.64       Labs/Procedures/Meds    Lab Results Reviewed reviewed       Nutrition Prescription PO    Current PO Diet Clear Liquid    Row Name 05/12/18 1441       Nutrition/Diet History    Typical Food/Fluid Intake Spoke w/patient at time of visit, patient stated she was still having some abdominal pain and nausea.  She has not had any of the clear liquids from her tray, but was getting ready to try a Boost Breeze.  Patient denies any recent unintentional weight loss.    Row Name 05/12/18 1440       Reason for Assessment    Reason For Assessment identified at risk by screening criteria    Diagnosis other (see comments)   abdominal pain, h/o migraines    Identified At Risk by Screening Criteria reduced oral intake over the last month;unintentional loss of 10 lbs or more in the past 2 mos          Problem/Interventions:        Problem 1     Row Name 05/12/18 1443       Nutrition Diagnoses Problem 1    Problem 1 Altered GI Function    Etiology (related to) Medical Diagnosis    Gastrointestinal Nausea;Vomiting    Signs/Symptoms (evidenced by) PO diet not tolerated                    Intervention Goal     Row Name 05/12/18 1443       Intervention Goal    General Nutrition support treatment    PO Advance diet;Tolerate PO            Nutrition Intervention     Row Name 05/12/18 1443       " Nutrition Intervention    RD/Tech Action Follow Tx progress;Care plan reviewd;Interview for preference              Education/Evaluation     Row Name 05/12/18 1447       Monitor/Evaluation    Monitor Per protocol;PO intake;Pertinent labs;Other (comment)   Monitor for advancement of po diet        Electronically signed by:  Nadia Floyd RD  05/12/18 2:43 PM   Time Spent:  20 minutes

## 2018-05-12 NOTE — PLAN OF CARE
Problem: Patient Care Overview  Goal: Plan of Care Review  Outcome: Ongoing (interventions implemented as appropriate)   05/12/18 0520   Coping/Psychosocial   Plan of Care Reviewed With patient   OTHER   Outcome Summary Pt. was up most of the night, pain controlled see MAR, one med. emesis treated see MAR, still no BM, & VSS.    Coping/Psychosocial   Patient Agreement with Plan of Care agrees   Plan of Care Review   Progress improving       Problem: Pain, Acute (Adult)  Goal: Identify Related Risk Factors and Signs and Symptoms  Outcome: Ongoing (interventions implemented as appropriate)   05/12/18 0520   Pain, Acute (Adult)   Related Risk Factors (Acute Pain) persistent pain   Signs and Symptoms (Acute Pain) verbalization of pain descriptors     Goal: Acceptable Pain Control/Comfort Level  Outcome: Ongoing (interventions implemented as appropriate)   05/12/18 0520   Pain, Acute (Adult)   Acceptable Pain Control/Comfort Level making progress toward outcome

## 2018-05-12 NOTE — DISCHARGE SUMMARY
[facility] Medicine Services  DISCHARGE SUMMARY    Patient Name: Nadia Houston  : 1980  MRN: [id number]    Date of Admission: 5/10/2018  Date of Discharge:  2018 (Saturday)  Primary Care Physician:  Kevin Morejon MD    Consults     Date and Time Order Name Status Description    5/10/2018 1525 Inpatient Gastroenterology Consult          Mark Brunner, MD - Gastroenterology    Hospital Course     Presenting Problem:   Abdominal pain [R10.9]    Active Hospital Problems (** Indicates Principal Problem)    Diagnosis Date Noted   • Abdominal pain [R10.9] 05/10/2018   • Hx of migraines [Z86.69] 05/10/2018      Resolved Hospital Problems    Diagnosis Date Noted Date Resolved   No resolved problems to display.      Nausea  Suspected Gastroparesis    Hospital Course:  Nadia Houston is a 38 y.o. female sent in as a direct admission from GI offices of Dr. Darwin Acevedo.  Patient was seen by GI by Dr. Mark Brunner and had an EGD with biopsies.  She was started on IV Reglan and a PPI.  She had mild improvement but wants to go home today to be with her 13 yr old son.      Procedure(s):  ESOPHAGOGASTRODUODENOSCOPY    1104 UPPER GI ENDOSCOPY   Day of Discharge     HPI:  Asking about going home.  She says she has to go take care of her 13 yr old son.  She says her friend is a critical care nurse and can watch out for her.    Review of Systems    Gen- No fevers, chills  CV- No chest pain, palpitations  Resp- No cough, dyspnea  GI- No N/V/D, + abd pain    Otherwise ROS is negative except as mentioned in the HPI.    Vital Signs:   Temp:  [98.2 °F (36.8 °C)-98.3 °F (36.8 °C)] 98.3 °F (36.8 °C)  Heart Rate:  [67] 67  Resp:  [18-20] 18  BP: ()/(54-57) 100/57     Physical Exam:    Gen:  NAD  HEENT:  No JVD  CVS:  RRR, s1 and s2  Lungs:  Clear  Abdomen:  Soft, + epigastric tenderness, no guarding, no rebound, obese  Ext:  No edema      Pertinent  and/or Most Recent Results        Results from last 7 days  Lab Units 05/11/18  0559 05/10/18  1534 05/07/18  1702   WBC 10*3/mm3 5.86 8.86 9.48   HEMOGLOBIN g/dL 11.5 12.3 12.3   HEMATOCRIT % 36.3 39.5 38.0   PLATELETS 10*3/mm3 266 281 318   SODIUM mmol/L 140 140 137   POTASSIUM mmol/L 3.5 3.9 3.5   CHLORIDE mmol/L 113* 111* 106   CO2 mmol/L 23.0 19.0* 22.0   BUN mg/dL 10 10 14   CREATININE mg/dL 0.80 0.90 0.90   GLUCOSE mg/dL 76 79 105*   CALCIUM mg/dL 8.6* 9.1 9.5       Results from last 7 days  Lab Units 05/11/18  0559 05/10/18  1534 05/07/18  1702   BILIRUBIN mg/dL 0.5 0.4 0.3   ALK PHOS U/L 60 63 71   ALT (SGPT) U/L 16 16 16   AST (SGOT) U/L 17 21 15           Invalid input(s): TG, LDLCALC, LDLREALC      Brief Urine Lab Results  (Last result in the past 365 days)      Color   Clarity   Blood   Leuk Est   Nitrite   Protein   CREAT   Urine HCG        05/10/18 2154               Negative     05/10/18 2154 Yellow Clear Negative Negative Negative Negative               Microbiology Results Abnormal     None          Imaging Results (all)     Procedure Component Value Units Date/Time    XR Abdomen KUB [[id number]] Collected:  05/10/18 2052     Updated:  05/12/18 0008    Narrative:       EXAMINATION: XR ABDOMEN KUB-      INDICATION: Abdominal pain.      COMPARISON: None.     FINDINGS: Mildly shaped scoliosis is noted. Stool shadow is seen  throughout normal caliber colon. Some small bowel gas is seen, but in  normal-appearing loops. No bowel wall edema, pneumatosis or pathologic  intraabdominal mass effect is seen. Clips are noted in the gallbladder  fossa. A small round calculus in the pelvis, 1 to 2 mm in diameter is  probably a phlebolith, but is in the general location of the distal  ureter.        Impression:       1. Nonobstructive bowel gas pattern.  2. Probable incidental phlebolith in the right pelvis. Please correlate  with any history of flank pain however.     D:  05/10/2018  E:  05/11/2018     This report was finalized on 5/12/2018  12:06 AM by DR. Angel Wolf MD.             Discharge Details      Nadia Houston   Home Medication Instructions ESE:[id number]    Printed on:05/12/18 8307   Medication Information                      hydrOXYzine (VISTARIL) 50 MG capsule  Take 100 mg by mouth Every Night.             metoclopramide (REGLAN) 10 MG tablet  Take 1 tablet by mouth 3 (Three) Times a Day As Needed (gastroparesis) for up to 14 days.             ondansetron (ZOFRAN) 4 MG tablet  Take 1 tablet by mouth Every 6 (Six) Hours As Needed for Nausea or Vomiting.             pantoprazole (PROTONIX) 40 MG EC tablet  Take 1 tablet by mouth Daily for 30 days.             topiramate (TOPAMAX) 100 MG tablet  Take 100 mg by mouth Daily.               Discharge Disposition:  Home or Self Care    Discharge Diet:  As tolerated    Discharge Activity: As tolerated    Special Instructions:  Will go home on Reglan PRN.  Likely will need close follow up.    No future appointments.    Additional Instructions for the Follow-ups that You Need to Schedule     Discharge Follow-up with PCP    As directed      Follow Up Details:  Primary Care Doctor - Kevin Morejon MD - [facility] - 1 week - nausea / abdominal pain         Discharge Follow-up with Specialty: Gastroenterology; 1 Week    As directed      Specialty:  Gastroenterology    Follow Up:  1 Week    Follow Up Details:  Darwin Acevedo - suspected gastroparesis             Discussed case with Dr. Brunner face to face on day of discharge.    Will likely need follow up with GI.  Referred initially from Dr. Acevedo's office.    Follow up with Dr. Acevedo in 1 week for possible outpatient gastric emptying study.  Follow up with PCP [facility].    Time Spent on Discharge:  40 minutes    Electronically signed by Dallas Albarran MD, 05/12/18, 2:45 PM.

## 2018-05-12 NOTE — PROGRESS NOTES
"GI Daily Progress Note  Subjective:    Chief Complaint:  Epigastric pain.    Nausea and epigastric pain improving. Had small emesis last evening. No BM.    Objective:    /57 (BP Location: Left arm, Patient Position: Lying)   Pulse 67   Temp 98.3 °F (36.8 °C) (Oral)   Resp 18   Ht 167.6 cm (66\")   Wt 90.7 kg (200 lb)   SpO2 97%   BMI 32.28 kg/m²     Physical Exam   Constitutional: She is oriented to person, place, and time. She appears well-developed and well-nourished. No distress.   HENT:   Head: Normocephalic.   Eyes: Conjunctivae are normal.   Neck: Normal range of motion.   Cardiovascular: Normal rate and regular rhythm.    No murmur heard.  Pulmonary/Chest: Effort normal and breath sounds normal. No respiratory distress. She has no wheezes. She has no rales.   Abdominal: Soft. Bowel sounds are normal. She exhibits no distension and no mass. There is no tenderness. There is no guarding.   +Obese   Musculoskeletal: Normal range of motion. She exhibits no edema.   Neurological: She is alert and oriented to person, place, and time.   Skin: Skin is warm and dry. Capillary refill takes less than 2 seconds. No rash noted.   Psychiatric: She has a normal mood and affect. Her behavior is normal.     Lab  Lab Results   Component Value Date    WBC 5.86 05/11/2018    HGB 11.5 05/11/2018    HGB 12.3 05/10/2018    HGB 12.3 05/07/2018    MCV 90.1 05/11/2018     05/11/2018       Lab Results   Component Value Date    GLUCOSE 76 05/11/2018    BUN 10 05/11/2018    CREATININE 0.80 05/11/2018    EGFRIFNONA 81 05/11/2018    BCR 12.5 05/11/2018    CO2 23.0 05/11/2018    CALCIUM 8.6 (L) 05/11/2018    ALBUMIN 3.70 05/11/2018    AST 17 05/11/2018    ALT 16 05/11/2018         Assessment:    Epigastric pain, improving after addition of Reglan.  Nausea and vomiting. Still with nausea.  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    Biopsies are negative for H pylori and celiac disease.    Plan:    Continue Reglan for 2 weeks " PRN  Home when tolerating 50% of meals.    Mark I. Brunner, MD  05/12/18  1:46 PM

## 2018-05-16 ENCOUNTER — OFFICE VISIT (OUTPATIENT)
Dept: GASTROENTEROLOGY | Facility: CLINIC | Age: 38
End: 2018-05-16

## 2018-05-16 VITALS
BODY MASS INDEX: 32.05 KG/M2 | OXYGEN SATURATION: 98 % | SYSTOLIC BLOOD PRESSURE: 112 MMHG | WEIGHT: 199.4 LBS | DIASTOLIC BLOOD PRESSURE: 90 MMHG | TEMPERATURE: 98.7 F | HEIGHT: 66 IN | HEART RATE: 82 BPM

## 2018-05-16 DIAGNOSIS — R10.13 EPIGASTRIC PAIN: Primary | ICD-10-CM

## 2018-05-16 DIAGNOSIS — R11.0 NAUSEA: ICD-10-CM

## 2018-05-16 PROCEDURE — 99213 OFFICE O/P EST LOW 20 MIN: CPT | Performed by: INTERNAL MEDICINE

## 2018-05-16 RX ORDER — ONDANSETRON 4 MG/1
4 TABLET, FILM COATED ORAL EVERY 6 HOURS PRN
Qty: 30 TABLET | Refills: 0 | Status: SHIPPED | OUTPATIENT
Start: 2018-05-16

## 2018-05-16 NOTE — PROGRESS NOTES
PCP: Kevin Morejon MD    Chief Complaint   Patient presents with   • Follow-up     Hosp       History of Present Illness:   HPI  Ms. Houston returns to the office for a follow-up visit.  The patient was admitted last week and had an upper endoscopy on Friday performed by Dr. Brunner.  She had no evidence for H. pylori or celiac disease on the biopsies.  There was some residual pills and food matter suggestive of possible gastroparesis.  The patient needed to go home because of her son and was discharged on Saturday.  She states that the nausea has continued.  She has been able to take in a small amount of boost liquid.  She has not been able to eat much with regard to solid food.  The patient states she had some chest discomfort that was not associated with shortness of breath.  She believes there may be some element of heartburn.  Past Medical History:   Diagnosis Date   • Cholelithiasis    • Gastritis    • Gastroparesis    • Migraine    • Ulcer        Past Surgical History:   Procedure Laterality Date   •  SECTION     • CHOLECYSTECTOMY     • ENDOSCOPY N/A 2018    Procedure: ESOPHAGOGASTRODUODENOSCOPY;  Surgeon: Mark I Brunner, MD;  Location: [facility];  Service: Gastroenterology   • KNEE SURGERY Left    • TONSILLECTOMY AND ADENOIDECTOMY     • UPPER GASTROINTESTINAL ENDOSCOPY           Current Outpatient Prescriptions:   •  hydrOXYzine (VISTARIL) 50 MG capsule, Take 50 mg by mouth Every Night., Disp: , Rfl:   •  metoclopramide (REGLAN) 10 MG tablet, Take 1 tablet by mouth 3 (Three) Times a Day As Needed (gastroparesis) for up to 14 days., Disp: 42 tablet, Rfl: 0  •  ondansetron (ZOFRAN) 4 MG tablet, Take 1 tablet by mouth Every 6 (Six) Hours As Needed for Nausea or Vomiting., Disp: 8 tablet, Rfl: 0  •  pantoprazole (PROTONIX) 40 MG EC tablet, Take 1 tablet by mouth Daily for 30 days., Disp: 30 tablet, Rfl: 0  •  topiramate (TOPAMAX) 100 MG tablet, Take 100 mg by mouth Daily., Disp: , Rfl:      No Known Allergies    Family History   Problem Relation Age of Onset   • Multiple sclerosis Mother        Social History     Social History   • Marital status: Single     Spouse name: N/A   • Number of children: N/A   • Years of education: N/A     Occupational History   • Not on file.     Social History Main Topics   • Smoking status: Never Smoker   • Smokeless tobacco: Never Used   • Alcohol use Yes      Comment: socially   • Drug use: No   • Sexual activity: Defer     Other Topics Concern   • Not on file     Social History Narrative    Works as a  at [employer], is independent of ADL's       Review of Systems   Constitutional: Positive for appetite change and fatigue. Negative for fever and unexpected weight change.   HENT: Negative for dental problem, mouth sores, postnasal drip, sneezing, trouble swallowing and voice change.    Eyes: Negative.  Negative for pain, redness and itching.   Respiratory: Negative.  Negative for cough, shortness of breath and wheezing.    Cardiovascular: Negative.  Negative for chest pain, palpitations and leg swelling.   Gastrointestinal: Positive for abdominal pain, nausea and vomiting. Negative for abdominal distention, anal bleeding, blood in stool, constipation, diarrhea and rectal pain.        Heartburn   Endocrine: Negative.  Negative for cold intolerance, heat intolerance, polydipsia and polyuria.   Genitourinary: Negative for dysuria, enuresis, flank pain, frequency, hematuria and urgency.   Musculoskeletal: Negative for arthralgias, back pain, joint swelling and myalgias.   Skin: Negative.  Negative for color change, pallor and rash.   Allergic/Immunologic: Negative.  Negative for environmental allergies, food allergies and immunocompromised state.   Neurological: Positive for weakness and headaches. Negative for dizziness, tremors, seizures, facial asymmetry and numbness.   Hematological: Negative.    Psychiatric/Behavioral: Negative.  Negative  for behavioral problems, dysphoric mood, hallucinations and self-injury.       Vitals:    05/16/18 1221   BP: 112/90   Pulse: 82   Temp: 98.7 °F (37.1 °C)   SpO2: 98%       Physical Exam   Constitutional: She is oriented to person, place, and time. She appears well-nourished. No distress.   HENT:   Head: Atraumatic.   Mouth/Throat: Oropharynx is clear and moist. No oropharyngeal exudate.   Eyes: EOM are normal. No scleral icterus.   Neck: Neck supple. No thyromegaly present.   Cardiovascular: Normal rate, regular rhythm and normal heart sounds.  Exam reveals no gallop.    No murmur heard.  Pulmonary/Chest: Effort normal and breath sounds normal. She has no wheezes. She has no rales.   Abdominal: Soft. Bowel sounds are normal. Tenderness: epigastrium. There is no rebound and no guarding.   Musculoskeletal: Normal range of motion. She exhibits no edema or tenderness.   Lymphadenopathy:     She has no cervical adenopathy.   Neurological: She is alert and oriented to person, place, and time. No sensory deficit.   Skin: Skin is dry. No rash noted. No erythema.   Psychiatric: She has a normal mood and affect. Her behavior is normal. Thought content normal.   Vitals reviewed.      Nadia was seen today for follow-up.    Diagnoses and all orders for this visit:    Epigastric pain  -     NM Gastric Emptying; Future  -     ondansetron (ZOFRAN) 4 MG tablet; Take 1 tablet by mouth Every 6 (Six) Hours As Needed for Nausea or Vomiting.    Nausea  -     NM Gastric Emptying; Future  -     ondansetron (ZOFRAN) 4 MG tablet; Take 1 tablet by mouth Every 6 (Six) Hours As Needed for Nausea or Vomiting.    The patient may have gastroparesis based on the endoscopy.  This condition is associated with nausea and upper abdominal pain.  She does have a history of previous kidney stone when she was a teenager.  The patient denies any flank pain at this time.      Plan: Will schedule a 4 hour gastric emptying study.           Will continue  with antinausea medicine and prokinetic.           Discussed that if the gastric emptying study is normal will need to evaluate for other etiologies with regard to nausea.    I spent over 50% of the office visit counseling and answering questions from the patient.

## 2018-05-17 ENCOUNTER — TELEPHONE (OUTPATIENT)
Dept: GASTROENTEROLOGY | Facility: CLINIC | Age: 38
End: 2018-05-17

## 2018-05-17 NOTE — TELEPHONE ENCOUNTER
CALLED PATIENT BACK. LA FAXED TO [facility]. ORIGINAL COPY WILL BE MAILED BACK TO PATIENT WITH RECEIPT FEE. PATIENT VOICED UNDERSTANDING AND STATED THANK YOU.

## 2018-05-23 ENCOUNTER — APPOINTMENT (OUTPATIENT)
Dept: NUCLEAR MEDICINE | Facility: HOSPITAL | Age: 38
End: 2018-05-23
Attending: INTERNAL MEDICINE

## 2018-05-23 ENCOUNTER — HOSPITAL ENCOUNTER (OUTPATIENT)
Dept: NUCLEAR MEDICINE | Facility: HOSPITAL | Age: 38
Discharge: HOME | End: 2018-05-23
Attending: INTERNAL MEDICINE

## 2018-05-23 DIAGNOSIS — R10.13 EPIGASTRIC PAIN: ICD-10-CM

## 2018-05-23 DIAGNOSIS — R11.0 NAUSEA: ICD-10-CM

## 2018-05-23 PROCEDURE — 0 TECHNETIUM SULFUR COLLOID: Performed by: INTERNAL MEDICINE

## 2018-05-23 PROCEDURE — 78264 GASTRIC EMPTYING IMG STUDY: CPT

## 2018-05-23 PROCEDURE — A9541 TC99M SULFUR COLLOID: HCPCS | Performed by: INTERNAL MEDICINE

## 2018-05-23 RX ADMIN — TECHNETIUM TC 99M SULFUR COLLOID 1 DOSE: KIT at 12:50

## 2018-05-29 ENCOUNTER — TELEPHONE (OUTPATIENT)
Dept: GASTROENTEROLOGY | Facility: CLINIC | Age: 38
End: 2018-05-29

## 2018-05-29 ENCOUNTER — HOSPITAL ENCOUNTER (OUTPATIENT)
Facility: HOSPITAL | Age: 38
Setting detail: OBSERVATION
Discharge: HOME | End: 2018-05-30
Attending: EMERGENCY MEDICINE | Admitting: INTERNAL MEDICINE

## 2018-05-29 DIAGNOSIS — R11.10 INTRACTABLE VOMITING, PRESENCE OF NAUSEA NOT SPECIFIED, UNSPECIFIED VOMITING TYPE: ICD-10-CM

## 2018-05-29 DIAGNOSIS — E86.0 DEHYDRATION: Primary | ICD-10-CM

## 2018-05-29 DIAGNOSIS — K31.84 GASTROPARESIS: ICD-10-CM

## 2018-05-29 PROBLEM — R11.2 INTRACTABLE NAUSEA AND VOMITING: Status: ACTIVE | Noted: 2018-05-29

## 2018-05-29 LAB
ALBUMIN SERPL-MCNC: 4.5 G/DL (ref 3.2–4.8)
ALBUMIN/GLOB SERPL: 1.6 G/DL (ref 1.5–2.5)
ALP SERPL-CCNC: 66 U/L (ref 25–100)
ALT SERPL W P-5'-P-CCNC: 19 U/L (ref 7–40)
ANION GAP SERPL CALCULATED.3IONS-SCNC: 10 MMOL/L (ref 3–11)
AST SERPL-CCNC: 19 U/L (ref 0–33)
BACTERIA UR QL AUTO: ABNORMAL /HPF
BASOPHILS # BLD AUTO: 0.02 10*3/MM3 (ref 0–0.2)
BASOPHILS NFR BLD AUTO: 0.2 % (ref 0–1)
BILIRUB SERPL-MCNC: 0.5 MG/DL (ref 0.3–1.2)
BILIRUB UR QL STRIP: NEGATIVE
BUN BLD-MCNC: 12 MG/DL (ref 9–23)
BUN/CREAT SERPL: 13.3 (ref 7–25)
CALCIUM SPEC-SCNC: 9.5 MG/DL (ref 8.7–10.4)
CHLORIDE SERPL-SCNC: 112 MMOL/L (ref 99–109)
CLARITY UR: CLEAR
CO2 SERPL-SCNC: 17 MMOL/L (ref 20–31)
COLOR UR: YELLOW
CREAT BLD-MCNC: 0.9 MG/DL (ref 0.6–1.3)
DEPRECATED RDW RBC AUTO: 41.2 FL (ref 37–54)
EOSINOPHIL # BLD AUTO: 0.16 10*3/MM3 (ref 0–0.3)
EOSINOPHIL NFR BLD AUTO: 1.9 % (ref 0–3)
ERYTHROCYTE [DISTWIDTH] IN BLOOD BY AUTOMATED COUNT: 12.9 % (ref 11.3–14.5)
GFR SERPL CREATININE-BSD FRML MDRD: 71 ML/MIN/1.73
GLOBULIN UR ELPH-MCNC: 2.9 GM/DL
GLUCOSE BLD-MCNC: 93 MG/DL (ref 70–100)
GLUCOSE UR STRIP-MCNC: NEGATIVE MG/DL
HCT VFR BLD AUTO: 40.8 % (ref 34.5–44)
HGB BLD-MCNC: 13.4 G/DL (ref 11.5–15.5)
HGB UR QL STRIP.AUTO: ABNORMAL
HOLD SPECIMEN: NORMAL
HOLD SPECIMEN: NORMAL
HYALINE CASTS UR QL AUTO: ABNORMAL /LPF
IMM GRANULOCYTES # BLD: 0.02 10*3/MM3 (ref 0–0.03)
IMM GRANULOCYTES NFR BLD: 0.2 % (ref 0–0.6)
KETONES UR QL STRIP: ABNORMAL
LEUKOCYTE ESTERASE UR QL STRIP.AUTO: NEGATIVE
LIPASE SERPL-CCNC: 33 U/L (ref 6–51)
LYMPHOCYTES # BLD AUTO: 1.46 10*3/MM3 (ref 0.6–4.8)
LYMPHOCYTES NFR BLD AUTO: 16.9 % (ref 24–44)
MAGNESIUM SERPL-MCNC: 2.2 MG/DL (ref 1.3–2.7)
MCH RBC QN AUTO: 28.5 PG (ref 27–31)
MCHC RBC AUTO-ENTMCNC: 32.8 G/DL (ref 32–36)
MCV RBC AUTO: 86.6 FL (ref 80–99)
MONOCYTES # BLD AUTO: 0.68 10*3/MM3 (ref 0–1)
MONOCYTES NFR BLD AUTO: 7.9 % (ref 0–12)
NEUTROPHILS # BLD AUTO: 6.28 10*3/MM3 (ref 1.5–8.3)
NEUTROPHILS NFR BLD AUTO: 72.9 % (ref 41–71)
NITRITE UR QL STRIP: NEGATIVE
PH UR STRIP.AUTO: <=5 [PH] (ref 5–8)
PLATELET # BLD AUTO: 233 10*3/MM3 (ref 150–450)
PMV BLD AUTO: 11.3 FL (ref 6–12)
POTASSIUM BLD-SCNC: 3.6 MMOL/L (ref 3.5–5.5)
PROT SERPL-MCNC: 7.4 G/DL (ref 5.7–8.2)
PROT UR QL STRIP: NEGATIVE
RBC # BLD AUTO: 4.71 10*6/MM3 (ref 3.89–5.14)
RBC # UR: ABNORMAL /HPF
REF LAB TEST METHOD: ABNORMAL
SODIUM BLD-SCNC: 139 MMOL/L (ref 132–146)
SP GR UR STRIP: >=1.03 (ref 1–1.03)
SQUAMOUS #/AREA URNS HPF: ABNORMAL /HPF
UROBILINOGEN UR QL STRIP: ABNORMAL
WBC NRBC COR # BLD: 8.62 10*3/MM3 (ref 3.5–10.8)
WBC UR QL AUTO: ABNORMAL /HPF
WHOLE BLOOD HOLD SPECIMEN: NORMAL
WHOLE BLOOD HOLD SPECIMEN: NORMAL

## 2018-05-29 PROCEDURE — 99284 EMERGENCY DEPT VISIT MOD MDM: CPT

## 2018-05-29 PROCEDURE — 96374 THER/PROPH/DIAG INJ IV PUSH: CPT

## 2018-05-29 PROCEDURE — 81001 URINALYSIS AUTO W/SCOPE: CPT | Performed by: EMERGENCY MEDICINE

## 2018-05-29 PROCEDURE — 83735 ASSAY OF MAGNESIUM: CPT | Performed by: EMERGENCY MEDICINE

## 2018-05-29 PROCEDURE — G0378 HOSPITAL OBSERVATION PER HR: HCPCS

## 2018-05-29 PROCEDURE — 83690 ASSAY OF LIPASE: CPT

## 2018-05-29 PROCEDURE — 96375 TX/PRO/DX INJ NEW DRUG ADDON: CPT

## 2018-05-29 PROCEDURE — 99219 PR INITIAL OBSERVATION CARE/DAY 50 MINUTES: CPT | Performed by: FAMILY MEDICINE

## 2018-05-29 PROCEDURE — 80053 COMPREHEN METABOLIC PANEL: CPT

## 2018-05-29 PROCEDURE — 85025 COMPLETE CBC W/AUTO DIFF WBC: CPT

## 2018-05-29 PROCEDURE — 96361 HYDRATE IV INFUSION ADD-ON: CPT

## 2018-05-29 PROCEDURE — 25010000002 ONDANSETRON PER 1 MG: Performed by: EMERGENCY MEDICINE

## 2018-05-29 PROCEDURE — 25010000002 METOCLOPRAMIDE PER 10 MG: Performed by: EMERGENCY MEDICINE

## 2018-05-29 RX ORDER — METOCLOPRAMIDE HYDROCHLORIDE 5 MG/ML
10 INJECTION INTRAMUSCULAR; INTRAVENOUS ONCE
Status: COMPLETED | OUTPATIENT
Start: 2018-05-29 | End: 2018-05-29

## 2018-05-29 RX ORDER — METOCLOPRAMIDE 10 MG/1
10 TABLET ORAL 3 TIMES DAILY PRN
Status: DISCONTINUED | OUTPATIENT
Start: 2018-05-29 | End: 2018-05-29

## 2018-05-29 RX ORDER — SODIUM CHLORIDE 0.9 % (FLUSH) 0.9 %
1-10 SYRINGE (ML) INJECTION AS NEEDED
Status: DISCONTINUED | OUTPATIENT
Start: 2018-05-29 | End: 2018-05-30 | Stop reason: HOSPADM

## 2018-05-29 RX ORDER — METOCLOPRAMIDE HYDROCHLORIDE 5 MG/ML
10 INJECTION INTRAMUSCULAR; INTRAVENOUS 3 TIMES DAILY PRN
Status: DISCONTINUED | OUTPATIENT
Start: 2018-05-29 | End: 2018-05-30 | Stop reason: HOSPADM

## 2018-05-29 RX ORDER — PANTOPRAZOLE SODIUM 40 MG/10ML
40 INJECTION, POWDER, LYOPHILIZED, FOR SOLUTION INTRAVENOUS
Status: DISCONTINUED | OUTPATIENT
Start: 2018-05-30 | End: 2018-05-30 | Stop reason: HOSPADM

## 2018-05-29 RX ORDER — SODIUM CHLORIDE 9 MG/ML
100 INJECTION, SOLUTION INTRAVENOUS CONTINUOUS
Status: DISCONTINUED | OUTPATIENT
Start: 2018-05-29 | End: 2018-05-30 | Stop reason: HOSPADM

## 2018-05-29 RX ORDER — TOPIRAMATE 100 MG/1
200 TABLET, FILM COATED ORAL NIGHTLY
Status: DISCONTINUED | OUTPATIENT
Start: 2018-05-29 | End: 2018-05-30 | Stop reason: HOSPADM

## 2018-05-29 RX ORDER — ACETAMINOPHEN 325 MG/1
650 TABLET ORAL EVERY 4 HOURS PRN
Status: DISCONTINUED | OUTPATIENT
Start: 2018-05-29 | End: 2018-05-30 | Stop reason: HOSPADM

## 2018-05-29 RX ORDER — PROMETHAZINE HYDROCHLORIDE 25 MG/1
25 TABLET ORAL EVERY 8 HOURS PRN
COMMUNITY

## 2018-05-29 RX ORDER — PANTOPRAZOLE SODIUM 40 MG/1
40 TABLET, DELAYED RELEASE ORAL DAILY
Status: DISCONTINUED | OUTPATIENT
Start: 2018-05-30 | End: 2018-05-29

## 2018-05-29 RX ORDER — ONDANSETRON 2 MG/ML
4 INJECTION INTRAMUSCULAR; INTRAVENOUS EVERY 6 HOURS PRN
Status: DISCONTINUED | OUTPATIENT
Start: 2018-05-29 | End: 2018-05-30 | Stop reason: HOSPADM

## 2018-05-29 RX ORDER — PROMETHAZINE HYDROCHLORIDE 25 MG/1
25 TABLET ORAL EVERY 8 HOURS PRN
Status: DISCONTINUED | OUTPATIENT
Start: 2018-05-29 | End: 2018-05-30 | Stop reason: HOSPADM

## 2018-05-29 RX ORDER — HYDROXYZINE HYDROCHLORIDE 25 MG/1
25 TABLET, FILM COATED ORAL NIGHTLY PRN
Status: DISCONTINUED | OUTPATIENT
Start: 2018-05-29 | End: 2018-05-30 | Stop reason: HOSPADM

## 2018-05-29 RX ORDER — METOCLOPRAMIDE 10 MG/1
10 TABLET ORAL 3 TIMES DAILY PRN
Status: ON HOLD | COMMUNITY
End: 2018-05-30

## 2018-05-29 RX ORDER — ONDANSETRON 2 MG/ML
4 INJECTION INTRAMUSCULAR; INTRAVENOUS ONCE
Status: COMPLETED | OUTPATIENT
Start: 2018-05-29 | End: 2018-05-29

## 2018-05-29 RX ORDER — SODIUM CHLORIDE 0.9 % (FLUSH) 0.9 %
10 SYRINGE (ML) INJECTION AS NEEDED
Status: DISCONTINUED | OUTPATIENT
Start: 2018-05-29 | End: 2018-05-30 | Stop reason: HOSPADM

## 2018-05-29 RX ADMIN — SODIUM CHLORIDE 100 ML/HR: 9 INJECTION, SOLUTION INTRAVENOUS at 23:17

## 2018-05-29 RX ADMIN — METOCLOPRAMIDE 10 MG: 5 INJECTION, SOLUTION INTRAMUSCULAR; INTRAVENOUS at 19:11

## 2018-05-29 RX ADMIN — HYDROXYZINE HYDROCHLORIDE 25 MG: 25 TABLET, FILM COATED ORAL at 23:22

## 2018-05-29 RX ADMIN — SODIUM CHLORIDE 2000 ML: 9 INJECTION, SOLUTION INTRAVENOUS at 19:10

## 2018-05-29 RX ADMIN — TOPIRAMATE 200 MG: 100 TABLET, FILM COATED ORAL at 23:22

## 2018-05-29 RX ADMIN — ONDANSETRON 4 MG: 2 INJECTION INTRAMUSCULAR; INTRAVENOUS at 19:11

## 2018-05-29 NOTE — TELEPHONE ENCOUNTER
I spoke with MsNoemi Celso this afternoon.  She states that over the last few days she has been unable to tolerate any solid food and very little liquids.  My suggestion at this time is referral for Dr. Landry Koenig or Dr. Niesha Fox at [facility] regarding gastric stimulator.  I told the patient that at this juncture the medical therapy  as discussed before is dietary changes and Reglan.  This is not a long-term solution for this particular medical condition as was discussed before with MsNoemi Celso. At this time the etiology is unknown.

## 2018-05-29 NOTE — TELEPHONE ENCOUNTER
----- Message from Jorge A Acevedo MD sent at 5/25/2018 12:13 PM EDT -----  Let Ms. Houston know the gastric emptying study was abnormal.  At 4 hours there was only 40% percent emptying.  Can consider a trial of Reglan but initially would began with a low residue, small portion and low-fat diet.  She should eat small portion more frequent meals.  Thanks,  GMW

## 2018-05-29 NOTE — TELEPHONE ENCOUNTER
Dr Acevedo,  I called patient and gave her gastric emptying study. She stated that she is very sick. She can't hardly drink a boost shake without getting sick. Patient stated she can't tolerated that diet.She can't eat anything.  She is completely out of Reglan. She also schedule an appointment to come in to see you tomorrow at 10:15am.

## 2018-05-30 VITALS
TEMPERATURE: 97.2 F | OXYGEN SATURATION: 100 % | HEIGHT: 66 IN | DIASTOLIC BLOOD PRESSURE: 67 MMHG | BODY MASS INDEX: 30.53 KG/M2 | RESPIRATION RATE: 18 BRPM | HEART RATE: 66 BPM | SYSTOLIC BLOOD PRESSURE: 121 MMHG | WEIGHT: 190 LBS

## 2018-05-30 PROBLEM — R11.2 INTRACTABLE NAUSEA AND VOMITING: Status: RESOLVED | Noted: 2018-05-29 | Resolved: 2018-05-30

## 2018-05-30 PROBLEM — E86.0 DEHYDRATION: Status: RESOLVED | Noted: 2018-05-29 | Resolved: 2018-05-30

## 2018-05-30 LAB
ANION GAP SERPL CALCULATED.3IONS-SCNC: 7 MMOL/L (ref 3–11)
BUN BLD-MCNC: 11 MG/DL (ref 9–23)
BUN/CREAT SERPL: 15.7 (ref 7–25)
CALCIUM SPEC-SCNC: 8.1 MG/DL (ref 8.7–10.4)
CHLORIDE SERPL-SCNC: 114 MMOL/L (ref 99–109)
CO2 SERPL-SCNC: 19 MMOL/L (ref 20–31)
CREAT BLD-MCNC: 0.7 MG/DL (ref 0.6–1.3)
DEPRECATED RDW RBC AUTO: 42.8 FL (ref 37–54)
ERYTHROCYTE [DISTWIDTH] IN BLOOD BY AUTOMATED COUNT: 13.3 % (ref 11.3–14.5)
GFR SERPL CREATININE-BSD FRML MDRD: 95 ML/MIN/1.73
GLUCOSE BLD-MCNC: 79 MG/DL (ref 70–100)
HCT VFR BLD AUTO: 35 % (ref 34.5–44)
HGB BLD-MCNC: 11.3 G/DL (ref 11.5–15.5)
MCH RBC QN AUTO: 28.5 PG (ref 27–31)
MCHC RBC AUTO-ENTMCNC: 32.3 G/DL (ref 32–36)
MCV RBC AUTO: 88.2 FL (ref 80–99)
PLATELET # BLD AUTO: 208 10*3/MM3 (ref 150–450)
PMV BLD AUTO: 12.1 FL (ref 6–12)
POTASSIUM BLD-SCNC: 3.5 MMOL/L (ref 3.5–5.5)
RBC # BLD AUTO: 3.97 10*6/MM3 (ref 3.89–5.14)
SODIUM BLD-SCNC: 140 MMOL/L (ref 132–146)
WBC NRBC COR # BLD: 6.87 10*3/MM3 (ref 3.5–10.8)

## 2018-05-30 PROCEDURE — 96375 TX/PRO/DX INJ NEW DRUG ADDON: CPT

## 2018-05-30 PROCEDURE — 96376 TX/PRO/DX INJ SAME DRUG ADON: CPT

## 2018-05-30 PROCEDURE — 80048 BASIC METABOLIC PNL TOTAL CA: CPT | Performed by: PHYSICIAN ASSISTANT

## 2018-05-30 PROCEDURE — G0378 HOSPITAL OBSERVATION PER HR: HCPCS

## 2018-05-30 PROCEDURE — 25010000002 ONDANSETRON PER 1 MG: Performed by: PHYSICIAN ASSISTANT

## 2018-05-30 PROCEDURE — 25010000002 METOCLOPRAMIDE PER 10 MG: Performed by: PHYSICIAN ASSISTANT

## 2018-05-30 PROCEDURE — 99217 PR OBSERVATION CARE DISCHARGE MANAGEMENT: CPT | Performed by: INTERNAL MEDICINE

## 2018-05-30 PROCEDURE — 99244 OFF/OP CNSLTJ NEW/EST MOD 40: CPT | Performed by: PHYSICIAN ASSISTANT

## 2018-05-30 PROCEDURE — 96361 HYDRATE IV INFUSION ADD-ON: CPT

## 2018-05-30 PROCEDURE — 85027 COMPLETE CBC AUTOMATED: CPT | Performed by: PHYSICIAN ASSISTANT

## 2018-05-30 RX ORDER — ASCORBIC ACID 1000 MG
550 TABLET ORAL 3 TIMES DAILY
Status: DISCONTINUED | OUTPATIENT
Start: 2018-05-30 | End: 2018-05-30 | Stop reason: HOSPADM

## 2018-05-30 RX ORDER — ASCORBIC ACID 1000 MG
550 TABLET ORAL 3 TIMES DAILY
Qty: 90 CAPSULE | Refills: 1 | Status: SHIPPED | OUTPATIENT
Start: 2018-05-30

## 2018-05-30 RX ORDER — METOCLOPRAMIDE 10 MG/1
10 TABLET ORAL 3 TIMES DAILY PRN
Qty: 30 TABLET | Refills: 0 | Status: SHIPPED | OUTPATIENT
Start: 2018-05-30 | End: 2018-05-31 | Stop reason: SDUPTHER

## 2018-05-30 RX ADMIN — ONDANSETRON 4 MG: 2 INJECTION INTRAMUSCULAR; INTRAVENOUS at 03:30

## 2018-05-30 RX ADMIN — ACETAMINOPHEN 650 MG: 325 TABLET, FILM COATED ORAL at 14:52

## 2018-05-30 RX ADMIN — SODIUM CHLORIDE 100 ML/HR: 9 INJECTION, SOLUTION INTRAVENOUS at 09:58

## 2018-05-30 RX ADMIN — METOCLOPRAMIDE 10 MG: 5 INJECTION, SOLUTION INTRAMUSCULAR; INTRAVENOUS at 06:27

## 2018-05-30 RX ADMIN — ONDANSETRON 4 MG: 2 INJECTION INTRAMUSCULAR; INTRAVENOUS at 09:55

## 2018-05-30 RX ADMIN — PANTOPRAZOLE SODIUM 40 MG: 40 INJECTION, POWDER, FOR SOLUTION INTRAVENOUS at 06:27

## 2018-05-31 ENCOUNTER — TELEPHONE (OUTPATIENT)
Dept: GASTROENTEROLOGY | Facility: CLINIC | Age: 38
End: 2018-05-31

## 2018-05-31 DIAGNOSIS — K31.84 GASTROPARESIS: Primary | ICD-10-CM

## 2018-05-31 DIAGNOSIS — R11.0 NAUSEA: ICD-10-CM

## 2018-05-31 RX ORDER — METOCLOPRAMIDE 10 MG/1
10 TABLET ORAL 4 TIMES DAILY
Qty: 120 TABLET | Refills: 1 | Status: SHIPPED | OUTPATIENT
Start: 2018-05-31

## 2018-05-31 NOTE — TELEPHONE ENCOUNTER
Dr Acevedo,  Patient called this morning. She stated she just got out of the hospital last night. They want her to follow up with you. She would like for you to give her a call first. Thanks

## 2018-06-06 ENCOUNTER — TELEPHONE (OUTPATIENT)
Dept: GASTROENTEROLOGY | Facility: CLINIC | Age: 38
End: 2018-06-06

## 2018-06-06 NOTE — TELEPHONE ENCOUNTER
Dr Acevedo,  Patient called this afternoon. She stated that she can't get in to see Dr Causey until 10 months. She wants to know if you can send a referral to [facility] to see if she can get in there sooner. She has lost 20 lbs in the last month & she still can't eat. What can she do until then?

## 2018-06-26 ENCOUNTER — TELEPHONE (OUTPATIENT)
Dept: GASTROENTEROLOGY | Facility: CLINIC | Age: 38
End: 2018-06-26

## 2018-06-26 NOTE — TELEPHONE ENCOUNTER
Called patient back. Informed her that Gastric Emptying study have been sent to Dr Persaud's office at [facility].   Metabolic encephalopathy Constipation Dementia with behavioral disturbance

## 2018-08-07 ENCOUNTER — TELEPHONE (OUTPATIENT)
Dept: GASTROENTEROLOGY | Facility: CLINIC | Age: 38
End: 2018-08-07

## 2018-08-07 NOTE — TELEPHONE ENCOUNTER
Pt called regarding paperwork Dr. Acevedo completed; pt states the dates are wrong. Advised pt I will forward a message to Dr. Acevedo with dates that need to be on the paperwork and to please FAX back to the number that is on the forms that needs to be completed by Dr. Acevedo.    Pt voiced understanding.

## 2018-08-14 NOTE — TELEPHONE ENCOUNTER
Called patient back. Additional information on disability forms need to be filled out by [facility] physicians per Dr Acevedo. No answer; left voice message.

## 2024-01-08 ENCOUNTER — APPOINTMENT (OUTPATIENT)
Dept: CT IMAGING | Facility: HOSPITAL | Age: 44
End: 2024-01-08
Payer: COMMERCIAL

## 2024-01-08 ENCOUNTER — APPOINTMENT (OUTPATIENT)
Dept: GENERAL RADIOLOGY | Facility: HOSPITAL | Age: 44
End: 2024-01-08
Payer: COMMERCIAL

## 2024-01-08 ENCOUNTER — HOSPITAL ENCOUNTER (EMERGENCY)
Facility: HOSPITAL | Age: 44
Discharge: LEFT BEFORE DISCHARGE | End: 2024-01-08
Attending: EMERGENCY MEDICINE | Admitting: INTERNAL MEDICINE
Payer: COMMERCIAL

## 2024-01-08 ENCOUNTER — APPOINTMENT (OUTPATIENT)
Dept: MRI IMAGING | Facility: HOSPITAL | Age: 44
End: 2024-01-08
Payer: COMMERCIAL

## 2024-01-08 VITALS
DIASTOLIC BLOOD PRESSURE: 80 MMHG | SYSTOLIC BLOOD PRESSURE: 143 MMHG | RESPIRATION RATE: 16 BRPM | BODY MASS INDEX: 24.11 KG/M2 | WEIGHT: 150 LBS | HEART RATE: 53 BPM | OXYGEN SATURATION: 100 % | TEMPERATURE: 97.7 F | HEIGHT: 66 IN

## 2024-01-08 DIAGNOSIS — G43.811 OTHER MIGRAINE WITH STATUS MIGRAINOSUS, INTRACTABLE: Primary | ICD-10-CM

## 2024-01-08 PROBLEM — R51.9 INTRACTABLE HEADACHE: Status: ACTIVE | Noted: 2024-01-08

## 2024-01-08 PROBLEM — G43.909 MIGRAINE: Status: ACTIVE | Noted: 2024-01-08

## 2024-01-08 LAB
ALBUMIN SERPL-MCNC: 4.2 G/DL (ref 3.5–5.2)
ALBUMIN/GLOB SERPL: 1.4 G/DL
ALP SERPL-CCNC: 75 U/L (ref 39–117)
ALT SERPL W P-5'-P-CCNC: 11 U/L (ref 1–33)
ANION GAP SERPL CALCULATED.3IONS-SCNC: 11 MMOL/L (ref 5–15)
AST SERPL-CCNC: 18 U/L (ref 1–32)
BASOPHILS # BLD AUTO: 0.05 10*3/MM3 (ref 0–0.2)
BASOPHILS NFR BLD AUTO: 0.6 % (ref 0–1.5)
BILIRUB SERPL-MCNC: 0.4 MG/DL (ref 0–1.2)
BUN BLDA-MCNC: 13 MG/DL
BUN BLDA-MCNC: 13 MG/DL (ref 8–26)
BUN SERPL-MCNC: 12 MG/DL (ref 6–20)
BUN/CREAT SERPL: 14.3 (ref 7–25)
CA-I BLDA-SCNC: 1.27 MMOL/L (ref 1.2–1.32)
CALCIUM BLD QL: 1.27 MG/DL
CALCIUM SPEC-SCNC: 8.7 MG/DL (ref 8.6–10.5)
CHLORIDE BLDA-SCNC: 110 MMOL/L (ref 98–109)
CHLORIDE BLDA-SCNC: 110 MMOL/L (ref 98–109)
CHLORIDE SERPL-SCNC: 111 MMOL/L (ref 98–107)
CO2 BLDA-SCNC: 20 MMOL/L (ref 24–29)
CO2 SERPL-SCNC: 20 MMOL/L (ref 22–29)
CREAT BLDA-MCNC: 0.8 MG/DL (ref 0.6–1.3)
CREAT BLDA-MCNC: 0.8 MG/DL (ref 0.6–1.3)
CREAT SERPL-MCNC: 0.84 MG/DL (ref 0.57–1)
DEPRECATED RDW RBC AUTO: 46.4 FL (ref 37–54)
EGFRCR SERPLBLD CKD-EPI 2021: 89.7 ML/MIN/1.73
EGFRCR SERPLBLD CKD-EPI 2021: 95.1 ML/MIN/1.73
EOSINOPHIL # BLD AUTO: 0.08 10*3/MM3 (ref 0–0.4)
EOSINOPHIL NFR BLD AUTO: 1 % (ref 0.3–6.2)
ERYTHROCYTE [DISTWIDTH] IN BLOOD BY AUTOMATED COUNT: 15.3 % (ref 12.3–15.4)
GLOBULIN UR ELPH-MCNC: 3.1 GM/DL
GLUCOSE BLDC GLUCOMTR-MCNC: 96 MG/DL (ref 70–130)
GLUCOSE BLDC GLUCOMTR-MCNC: 96 MG/DL (ref 70–130)
GLUCOSE SERPL-MCNC: 97 MG/DL (ref 65–99)
HCT VFR BLD AUTO: 38 % (ref 34–46.6)
HCT VFR BLDA CALC: 38 % (ref 38–51)
HCT VFR BLDA CALC: 38 % (ref 38–51)
HGB BLD-MCNC: 11.5 G/DL (ref 12–15.9)
HGB BLDA-MCNC: 12.9 G/DL (ref 12–17)
HGB BLDA-MCNC: 12.9 G/DL (ref 12–17)
HOLD SPECIMEN: NORMAL
IMM GRANULOCYTES # BLD AUTO: 0.02 10*3/MM3 (ref 0–0.05)
IMM GRANULOCYTES NFR BLD AUTO: 0.3 % (ref 0–0.5)
INR PPP: 1 (ref 0.9–1.1)
INR PPP: 1.1 (ref 0.8–1.2)
LYMPHOCYTES # BLD AUTO: 2.05 10*3/MM3 (ref 0.7–3.1)
LYMPHOCYTES NFR BLD AUTO: 26.1 % (ref 19.6–45.3)
MCH RBC QN AUTO: 25.2 PG (ref 26.6–33)
MCHC RBC AUTO-ENTMCNC: 30.3 G/DL (ref 31.5–35.7)
MCV RBC AUTO: 83.3 FL (ref 79–97)
MONOCYTES # BLD AUTO: 0.46 10*3/MM3 (ref 0.1–0.9)
MONOCYTES NFR BLD AUTO: 5.9 % (ref 5–12)
NEUTROPHILS NFR BLD AUTO: 5.18 10*3/MM3 (ref 1.7–7)
NEUTROPHILS NFR BLD AUTO: 66.1 % (ref 42.7–76)
NRBC BLD AUTO-RTO: 0 /100 WBC (ref 0–0.2)
PLATELET # BLD AUTO: 419 10*3/MM3 (ref 140–450)
PMV BLD AUTO: 10.7 FL (ref 6–12)
POTASSIUM BLDA-SCNC: 3.7 MMOL/L (ref 3.5–4.9)
POTASSIUM BLDA-SCNC: 3.7 MMOL/L (ref 3.5–4.9)
POTASSIUM SERPL-SCNC: 3.7 MMOL/L (ref 3.5–5.2)
PROT SERPL-MCNC: 7.3 G/DL (ref 6–8.5)
PROTHROMBIN TIME: 13.3 SECONDS
PROTHROMBIN TIME: 13.3 SECONDS (ref 12.8–15.2)
QT INTERVAL: 430 MS
QTC INTERVAL: 407 MS
RBC # BLD AUTO: 4.56 10*6/MM3 (ref 3.77–5.28)
SODIUM BLD-SCNC: 143 MMOL/L (ref 138–146)
SODIUM BLD-SCNC: 143 MMOL/L (ref 138–146)
SODIUM SERPL-SCNC: 142 MMOL/L (ref 136–145)
WBC NRBC COR # BLD AUTO: 7.84 10*3/MM3 (ref 3.4–10.8)
WHOLE BLOOD HOLD COAG: NORMAL
WHOLE BLOOD HOLD SPECIMEN: NORMAL

## 2024-01-08 PROCEDURE — 70496 CT ANGIOGRAPHY HEAD: CPT

## 2024-01-08 PROCEDURE — 93005 ELECTROCARDIOGRAM TRACING: CPT | Performed by: EMERGENCY MEDICINE

## 2024-01-08 PROCEDURE — 99282 EMERGENCY DEPT VISIT SF MDM: CPT | Performed by: NURSE PRACTITIONER

## 2024-01-08 PROCEDURE — 25510000001 IOPAMIDOL PER 1 ML: Performed by: EMERGENCY MEDICINE

## 2024-01-08 PROCEDURE — 96375 TX/PRO/DX INJ NEW DRUG ADDON: CPT

## 2024-01-08 PROCEDURE — 25010000002 DIPHENHYDRAMINE PER 50 MG: Performed by: EMERGENCY MEDICINE

## 2024-01-08 PROCEDURE — 70450 CT HEAD/BRAIN W/O DYE: CPT

## 2024-01-08 PROCEDURE — 99221 1ST HOSP IP/OBS SF/LOW 40: CPT | Performed by: INTERNAL MEDICINE

## 2024-01-08 PROCEDURE — 80047 BASIC METABLC PNL IONIZED CA: CPT

## 2024-01-08 PROCEDURE — 25010000002 DEXAMETHASONE SODIUM PHOSPHATE 100 MG/10ML SOLUTION: Performed by: EMERGENCY MEDICINE

## 2024-01-08 PROCEDURE — 70498 CT ANGIOGRAPHY NECK: CPT

## 2024-01-08 PROCEDURE — 0042T HC CT CEREBRAL PERFUSION W/WO CONTRAST: CPT

## 2024-01-08 PROCEDURE — 96374 THER/PROPH/DIAG INJ IV PUSH: CPT

## 2024-01-08 PROCEDURE — 25010000002 PROCHLORPERAZINE 10 MG/2ML SOLUTION: Performed by: EMERGENCY MEDICINE

## 2024-01-08 PROCEDURE — 25010000002 KETOROLAC TROMETHAMINE PER 15 MG: Performed by: EMERGENCY MEDICINE

## 2024-01-08 PROCEDURE — 85025 COMPLETE CBC W/AUTO DIFF WBC: CPT | Performed by: EMERGENCY MEDICINE

## 2024-01-08 PROCEDURE — 70553 MRI BRAIN STEM W/O & W/DYE: CPT

## 2024-01-08 PROCEDURE — 85014 HEMATOCRIT: CPT

## 2024-01-08 PROCEDURE — A9577 INJ MULTIHANCE: HCPCS | Performed by: EMERGENCY MEDICINE

## 2024-01-08 PROCEDURE — 99285 EMERGENCY DEPT VISIT HI MDM: CPT

## 2024-01-08 PROCEDURE — G0378 HOSPITAL OBSERVATION PER HR: HCPCS

## 2024-01-08 PROCEDURE — 71045 X-RAY EXAM CHEST 1 VIEW: CPT

## 2024-01-08 PROCEDURE — 0 GADOBENATE DIMEGLUMINE 529 MG/ML SOLUTION: Performed by: EMERGENCY MEDICINE

## 2024-01-08 PROCEDURE — 96372 THER/PROPH/DIAG INJ SC/IM: CPT

## 2024-01-08 PROCEDURE — 80053 COMPREHEN METABOLIC PANEL: CPT | Performed by: EMERGENCY MEDICINE

## 2024-01-08 PROCEDURE — 85610 PROTHROMBIN TIME: CPT

## 2024-01-08 PROCEDURE — 25810000003 SODIUM CHLORIDE 0.9 % SOLUTION: Performed by: EMERGENCY MEDICINE

## 2024-01-08 RX ORDER — KETOROLAC TROMETHAMINE 30 MG/ML
30 INJECTION, SOLUTION INTRAMUSCULAR; INTRAVENOUS EVERY 6 HOURS PRN
Status: DISCONTINUED | OUTPATIENT
Start: 2024-01-08 | End: 2024-01-08 | Stop reason: HOSPADM

## 2024-01-08 RX ORDER — KETOROLAC TROMETHAMINE 15 MG/ML
15 INJECTION, SOLUTION INTRAMUSCULAR; INTRAVENOUS ONCE
Status: COMPLETED | OUTPATIENT
Start: 2024-01-08 | End: 2024-01-08

## 2024-01-08 RX ORDER — SUMATRIPTAN 6 MG/.5ML
6 INJECTION, SOLUTION SUBCUTANEOUS ONCE
Status: COMPLETED | OUTPATIENT
Start: 2024-01-08 | End: 2024-01-08

## 2024-01-08 RX ORDER — PROCHLORPERAZINE EDISYLATE 5 MG/ML
10 INJECTION INTRAMUSCULAR; INTRAVENOUS ONCE
Status: COMPLETED | OUTPATIENT
Start: 2024-01-08 | End: 2024-01-08

## 2024-01-08 RX ORDER — ACETAMINOPHEN 650 MG/1
650 SUPPOSITORY RECTAL EVERY 4 HOURS PRN
Status: CANCELLED | OUTPATIENT
Start: 2024-01-08

## 2024-01-08 RX ORDER — SODIUM CHLORIDE 9 MG/ML
40 INJECTION, SOLUTION INTRAVENOUS AS NEEDED
Status: CANCELLED | OUTPATIENT
Start: 2024-01-08

## 2024-01-08 RX ORDER — ACETAMINOPHEN 160 MG/5ML
650 SOLUTION ORAL EVERY 4 HOURS PRN
Status: CANCELLED | OUTPATIENT
Start: 2024-01-08

## 2024-01-08 RX ORDER — SODIUM CHLORIDE 0.9 % (FLUSH) 0.9 %
10 SYRINGE (ML) INJECTION AS NEEDED
Status: DISCONTINUED | OUTPATIENT
Start: 2024-01-08 | End: 2024-01-08 | Stop reason: HOSPADM

## 2024-01-08 RX ORDER — PROMETHAZINE HYDROCHLORIDE 25 MG/1
25 TABLET ORAL EVERY 8 HOURS PRN
Status: CANCELLED | OUTPATIENT
Start: 2024-01-08

## 2024-01-08 RX ORDER — DEXAMETHASONE SODIUM PHOSPHATE 4 MG/ML
8 INJECTION, SOLUTION INTRA-ARTICULAR; INTRALESIONAL; INTRAMUSCULAR; INTRAVENOUS; SOFT TISSUE EVERY 24 HOURS
Status: DISCONTINUED | OUTPATIENT
Start: 2024-01-09 | End: 2024-01-08 | Stop reason: HOSPADM

## 2024-01-08 RX ORDER — TEMAZEPAM 15 MG/1
15 CAPSULE ORAL NIGHTLY
Status: DISCONTINUED | OUTPATIENT
Start: 2024-01-08 | End: 2024-01-08 | Stop reason: HOSPADM

## 2024-01-08 RX ORDER — ONDANSETRON 4 MG/1
4 TABLET, FILM COATED ORAL EVERY 6 HOURS PRN
Status: CANCELLED | OUTPATIENT
Start: 2024-01-08

## 2024-01-08 RX ORDER — DIPHENHYDRAMINE HYDROCHLORIDE 50 MG/ML
25 INJECTION INTRAMUSCULAR; INTRAVENOUS ONCE
Status: COMPLETED | OUTPATIENT
Start: 2024-01-08 | End: 2024-01-08

## 2024-01-08 RX ORDER — METOCLOPRAMIDE 10 MG/1
10 TABLET ORAL 4 TIMES DAILY
Status: CANCELLED | OUTPATIENT
Start: 2024-01-08

## 2024-01-08 RX ORDER — SODIUM CHLORIDE 0.9 % (FLUSH) 0.9 %
10 SYRINGE (ML) INJECTION AS NEEDED
Status: CANCELLED | OUTPATIENT
Start: 2024-01-08

## 2024-01-08 RX ORDER — SODIUM CHLORIDE 9 MG/ML
125 INJECTION, SOLUTION INTRAVENOUS CONTINUOUS
Status: DISCONTINUED | OUTPATIENT
Start: 2024-01-08 | End: 2024-01-08 | Stop reason: HOSPADM

## 2024-01-08 RX ORDER — ACETAMINOPHEN 325 MG/1
650 TABLET ORAL EVERY 4 HOURS PRN
Status: CANCELLED | OUTPATIENT
Start: 2024-01-08

## 2024-01-08 RX ORDER — SODIUM CHLORIDE 0.9 % (FLUSH) 0.9 %
10 SYRINGE (ML) INJECTION EVERY 12 HOURS SCHEDULED
Status: CANCELLED | OUTPATIENT
Start: 2024-01-08

## 2024-01-08 RX ADMIN — IOPAMIDOL 115 ML: 755 INJECTION, SOLUTION INTRAVENOUS at 11:20

## 2024-01-08 RX ADMIN — DEXAMETHASONE SODIUM PHOSPHATE 10 MG: 10 INJECTION, SOLUTION INTRAMUSCULAR; INTRAVENOUS at 14:32

## 2024-01-08 RX ADMIN — GADOBENATE DIMEGLUMINE 14 ML: 529 INJECTION, SOLUTION INTRAVENOUS at 12:28

## 2024-01-08 RX ADMIN — SUMATRIPTAN 6 MG: 6 INJECTION, SOLUTION SUBCUTANEOUS at 14:23

## 2024-01-08 RX ADMIN — DIPHENHYDRAMINE HYDROCHLORIDE 25 MG: 50 INJECTION INTRAMUSCULAR; INTRAVENOUS at 11:58

## 2024-01-08 RX ADMIN — SODIUM CHLORIDE 1000 ML: 9 INJECTION, SOLUTION INTRAVENOUS at 14:23

## 2024-01-08 RX ADMIN — KETOROLAC TROMETHAMINE 15 MG: 15 INJECTION, SOLUTION INTRAMUSCULAR; INTRAVENOUS at 11:58

## 2024-01-08 RX ADMIN — SODIUM CHLORIDE 500 ML: 9 INJECTION, SOLUTION INTRAVENOUS at 11:58

## 2024-01-08 RX ADMIN — PROCHLORPERAZINE EDISYLATE 10 MG: 5 INJECTION INTRAMUSCULAR; INTRAVENOUS at 11:59

## 2024-01-08 NOTE — H&P
[facility] Medicine Services  HISTORY AND PHYSICAL    Patient Name: Nadia Houston  : 1980  MRN: [id number]  Primary Care Physician: Provider, No Known  Date of admission: 2024      Subjective   Subjective     Chief Complaint:  headache    HPI:  Nadia Houston is a 43 y.o. female w/ gastroparesis requiring jejunostomy tube, migraines, Covid 19 infection in 2023 managed at home, with recent jejunostomy complication currently on full liquid diet. She presents for intractable headache that started 3-4 days ago, worse than any prior headache she has had, mostly RT sided and worse with positional changes, cough, and has associated spots in her vision in the RT eye. She developed tingling in her RT face. She received a migraine cocktail in the ED w/o significant improvement.      Personal History     Past Medical History:   Diagnosis Date    Cholelithiasis     Gastritis     Gastroparesis     Migraine     Ulcer            Past Surgical History:   Procedure Laterality Date     SECTION      CHOLECYSTECTOMY      ENDOSCOPY N/A 2018    Procedure: ESOPHAGOGASTRODUODENOSCOPY;  Surgeon: Mark I Brunner, MD;  Location: [facility] ENDOSCOPY;  Service: Gastroenterology    KNEE SURGERY Left     TONSILLECTOMY AND ADENOIDECTOMY      UPPER GASTROINTESTINAL ENDOSCOPY         Family History: family history includes Multiple sclerosis in her mother.     Social History:  reports that she has never smoked. She has never used smokeless tobacco. She reports current alcohol use. She reports that she does not use drugs.  Social History     Social History Narrative    Works as a  at [employer], is independent of ADL's       Medications:  Available home medication information reviewed.  Razia Root, hydrOXYzine pamoate, metoclopramide, ondansetron, promethazine, and topiramate    Allergies   Allergen Reactions    Sulfa Antibiotics Unknown - High Severity      FROM CHILDHOOD       Objective   Objective     Vital Signs:   Temp:  [97.7 °F (36.5 °C)] 97.7 °F (36.5 °C)  Heart Rate:  [66] 66  Resp:  [16] 16  BP: (132)/(75) 132/75  Total (NIH Stroke Scale): 2    Physical Exam   Constitutional: Awake, alert, laying quietly in a dark room in the ED in [facility]  HENT: NCAT, mucous membranes moist  Respiratory: Respiratory effort normal   Cardiovascular: RRR  Gastrointestinal: Nondistended abdomen  Psychiatric: Appropriate affect, cooperative  Neurologic: Speech clear and fluent    Result Review:  I have personally reviewed the results from the time of this admission to 1/8/2024 14:24 EST and agree with these findings:  []  Laboratory list / accordion  []  Microbiology  [x]  Radiology  []  EKG/Telemetry   []  Cardiology/Vascular   []  Pathology  []  Old records  []  Other:  Most notable findings include: non-acute MRI of the brain      LAB RESULTS:      Lab 01/08/24  1139 01/08/24  1138 01/08/24  1120 01/08/24  1119 01/08/24  1118   WBC  --   --   --   --  7.84   HEMOGLOBIN  --   --   --   --  11.5*   HEMOGLOBIN, POC  --  12.9  --  12.9  --    HEMATOCRIT  --   --   --   --  38.0   HEMATOCRIT POC  --  38  --  38  --    PLATELETS  --   --   --   --  419   NEUTROS ABS  --   --   --   --  5.18   IMMATURE GRANS (ABS)  --   --   --   --  0.02   LYMPHS ABS  --   --   --   --  2.05   MONOS ABS  --   --   --   --  0.46   EOS ABS  --   --   --   --  0.08   MCV  --   --   --   --  83.3   PROTIME 13.3  --  13.3  --   --    INR 1  --  1.1  --   --          Lab 01/08/24  1138 01/08/24  1119 01/08/24  1118   SODIUM  --   --  142   POTASSIUM  --   --  3.7   CHLORIDE  --   --  111*   CO2  --   --  20.0*   ANION GAP  --   --  11.0   BUN  --   --  12   CREATININE 0.80 0.80 0.84   EGFR  --  95.1 89.7   GLUCOSE  --   --  97   CALCIUM  --   --  8.7         Lab 01/08/24  1118   TOTAL PROTEIN 7.3   ALBUMIN 4.2   GLOBULIN 3.1   ALT (SGPT) 11   AST (SGOT) 18   BILIRUBIN 0.4   ALK PHOS 75                          Microbiology Results (last 10 days)       ** No results found for the last 240 hours. **            MRI Brain With & Without Contrast    Result Date: 1/8/2024  MRI BRAIN W WO CONTRAST Date of Exam: 1/8/2024 12:16 PM EST Indication: Headache, RUE weakness, Right facial/RUE numbness.  Comparison: CT earlier the same day. Technique:  Routine multiplanar/multisequence sequence images of the brain were obtained before and after the uneventful administration of 14 mL Multihance. Findings: No acute infarct is present on diffusion weighted sequences. Midline structures are normal and the craniocervical junction appears satisfactory. Gray-white differentiation is maintained and there is no evidence of intracranial hemorrhage, mass or mass effect. The ventricles are normal in size and configuration. The orbits are normal. The paranasal sinuses are clear. Intracranial arterial flow voids are maintained. There is no abnormal brain parenchymal enhancement.     Impression: Impression: Normal contrast-enhanced MRI of the brain as above. Electronically Signed: Humberto Shine MD  1/8/2024 12:47 PM EST  Workstation ID: [id number]    XR Chest 1 View    Result Date: 1/8/2024  XR CHEST 1 VW Date of Exam: 1/8/2024 11:41 AM EST Indication: Stroke Protocol (Onset > 12 Hrs) Comparison: None available. Findings: There is a moderate S-shaped scoliosis. Heart and pulmonary vessels appear within normal limits. Lung fields are clear. There are no effusions.     Impression: Impression: No acute process. Electronically Signed: Lissa Martinez MD  1/8/2024 12:02 PM EST  Workstation ID: [id number]    CT CEREBRAL PERFUSION WITH & WITHOUT CONTRAST    Result Date: 1/8/2024  CT ANGIOGRAM HEAD W AI ANALYSIS OF LVO, CT ANGIOGRAM NECK, CT CEREBRAL PERFUSION W WO CONTRAST Date of Exam: 1/8/2024 11:13 AM EST Indication: stroke sxs. Comparison: None available. Technique: CTA of the head was performed after the uneventful intravenous administration of 115 mL  Isovue-370. Reconstructed coronal and sagittal images were also obtained. In addition, a 3-D volume rendered image was created for interpretation. Automated exposure control and iterative reconstruction methods were used. FINDINGS: Vascular Findings: The right common carotid, internal carotid, middle cerebral, anterior cerebral, vertebral, and posterior cerebral arteries are patent without abrupt cut off or aneurysmal dilation. The left common carotid, internal carotid, middle cerebral, anterior cerebral, vertebral, and posterior cerebral arteries are patent without abrupt cut off or aneurysmal dilation. A1 segment of the left anterior cerebral artery is diminutive or absent. Basilar artery appears patent and appears unremarkable. Non-vascular Findings: For description of nonvascular intracranial findings, please refer to the noncontrast head CT performed the same date. No acute abnormality is identified within the visualized soft tissue or bony structures of the neck. The visualized lung apices are clear. FINDINGS: CT Perfusion: CBF (<30%) volume: 0 mL Tmax (>6.0s) volume: 0 mL Mismatch volume: 0 mL Mismatch ratio: None     Impression: 1.No acute abnormality identified within the large arteries of the head or neck. 2.No significant stenosis of the bilateral internal carotid arteries. 3.CT perfusion study demonstrates no territorial ischemia or core infarct. Electronically Signed: Ben Vasquez MD  1/8/2024 11:37 AM EST  Workstation ID: [id number]    CT Angiogram Head w AI Analysis of LVO    Result Date: 1/8/2024  CT ANGIOGRAM HEAD W AI ANALYSIS OF LVO, CT ANGIOGRAM NECK, CT CEREBRAL PERFUSION W WO CONTRAST Date of Exam: 1/8/2024 11:13 AM EST Indication: stroke sxs. Comparison: None available. Technique: CTA of the head was performed after the uneventful intravenous administration of 115 mL Isovue-370. Reconstructed coronal and sagittal images were also obtained. In addition, a 3-D volume rendered image was created  for interpretation. Automated exposure control and iterative reconstruction methods were used. FINDINGS: Vascular Findings: The right common carotid, internal carotid, middle cerebral, anterior cerebral, vertebral, and posterior cerebral arteries are patent without abrupt cut off or aneurysmal dilation. The left common carotid, internal carotid, middle cerebral, anterior cerebral, vertebral, and posterior cerebral arteries are patent without abrupt cut off or aneurysmal dilation. A1 segment of the left anterior cerebral artery is diminutive or absent. Basilar artery appears patent and appears unremarkable. Non-vascular Findings: For description of nonvascular intracranial findings, please refer to the noncontrast head CT performed the same date. No acute abnormality is identified within the visualized soft tissue or bony structures of the neck. The visualized lung apices are clear. FINDINGS: CT Perfusion: CBF (<30%) volume: 0 mL Tmax (>6.0s) volume: 0 mL Mismatch volume: 0 mL Mismatch ratio: None     Impression: 1.No acute abnormality identified within the large arteries of the head or neck. 2.No significant stenosis of the bilateral internal carotid arteries. 3.CT perfusion study demonstrates no territorial ischemia or core infarct. Electronically Signed: Ben Vasquez MD  1/8/2024 11:37 AM EST  Workstation ID: [id number]    CT Angiogram Neck    Result Date: 1/8/2024  CT ANGIOGRAM HEAD W AI ANALYSIS OF LVO, CT ANGIOGRAM NECK, CT CEREBRAL PERFUSION W WO CONTRAST Date of Exam: 1/8/2024 11:13 AM EST Indication: stroke sxs. Comparison: None available. Technique: CTA of the head was performed after the uneventful intravenous administration of 115 mL Isovue-370. Reconstructed coronal and sagittal images were also obtained. In addition, a 3-D volume rendered image was created for interpretation. Automated exposure control and iterative reconstruction methods were used. FINDINGS: Vascular Findings: The right common  carotid, internal carotid, middle cerebral, anterior cerebral, vertebral, and posterior cerebral arteries are patent without abrupt cut off or aneurysmal dilation. The left common carotid, internal carotid, middle cerebral, anterior cerebral, vertebral, and posterior cerebral arteries are patent without abrupt cut off or aneurysmal dilation. A1 segment of the left anterior cerebral artery is diminutive or absent. Basilar artery appears patent and appears unremarkable. Non-vascular Findings: For description of nonvascular intracranial findings, please refer to the noncontrast head CT performed the same date. No acute abnormality is identified within the visualized soft tissue or bony structures of the neck. The visualized lung apices are clear. FINDINGS: CT Perfusion: CBF (<30%) volume: 0 mL Tmax (>6.0s) volume: 0 mL Mismatch volume: 0 mL Mismatch ratio: None     Impression: 1.No acute abnormality identified within the large arteries of the head or neck. 2.No significant stenosis of the bilateral internal carotid arteries. 3.CT perfusion study demonstrates no territorial ischemia or core infarct. Electronically Signed: Ben Vasquez MD  1/8/2024 11:37 AM EST  Workstation ID: [id number]    CT Head Without Contrast Stroke Protocol    Result Date: 1/8/2024  CT HEAD WO CONTRAST STROKE PROTOCOL Date of Exam: 1/8/2024 11:07 AM EST Indication: Stroke, follow up. Comparison: None available. Technique: Axial CT images were obtained of the head without contrast administration.  Reconstructed coronal images were also obtained. Automated exposure control and iterative construction methods were used. Scan Time: 11:07 Results discussed with the stroke coordinator by Ang Dunham MD at 11:10. Findings: No evidence of acute intracranial hemorrhage or mass effect. No extra-axial collection. The gray white matter differentiation is preserved. Ventricles and sulci are symmetric. The mastoid air cells and paranasal sinuses are well  aerated. Globes and extraocular muscles are unremarkable. No acute or suspicious osseous abnormality. Soft tissues within normal limits.     Impression: Impression: No evidence of acute intracranial abnormality. Electronically Signed: Ang Dunham MD  1/8/2024 11:16 AM EST  Workstation ID: [id number]         Assessment & Plan   Assessment & Plan       Intractable headache    Gastroparesis    Summary: This is a 42 y/o female w/ gastroparesis, jejunostomy tube placement w/ recent complication requiring oral diet, migraines, recent Covid 19 infection Dec 2023, who presented w/ 3-4 days of intractable headache worse than prior migraines    Assessment/Plan    Intractable headache  Hx migraines  RT sided paresthesias  -initially seen by stroke service in the ED, suspected neuro findings related to headache, MRI brain negative for acute ischemic event  -d/w general neuro, rec'd volume replacement, decadron x3 days, home topamax, and sleep aid    Gastroparesis  -follows w/ [facility]  -full liquid diet        DVT prophylaxis:  No DVT prophylaxis order currently exists.      CODE STATUS:    Code Status and Medical Interventions:   Ordered at: 01/08/24 1423     Code Status (Patient has no pulse and is not breathing):    CPR (Attempt to Resuscitate)     Medical Interventions (Patient has pulse or is breathing):    Full Support       Expected Discharge   Expected Discharge Date: 1/10/2024; Expected Discharge Time:      Domo Gallo DO  01/08/24

## 2024-01-08 NOTE — CONSULTS
Stroke Consult Note    Patient Name: Nadia Houston   MRN: [id number]  Age: 43 y.o.  Sex: female  : 1980    Primary Care Physician: Kevin Morejon MD  Referring Physician:  No ref. provider found    TIME STROKE TEAM CALLED: 1102 EST     TIME PATIENT SEEN: 1105 EST    Handedness: Right  Race:     Chief Complaint/Reason for Consultation: Headache, right-sided numbness    Subjective .    HPI:Nadia Houston 43-year-old female with a PMH significant for migraine (on Topamax, Imitrex), anxiety, insomnia, and gastroparesis who presents to [facility] ED with complaints of headache and left-sided numbness.  Patient reports that she had a migraine that started on Friday.  It has persisted since that time.  This a.m. at approximately 10:00, she noted a continued headache and new left-sided numbness.    /75. NIH 2. Patient with baseline facial asymmetry on the left.  Right facial numbness, RUE numbness, and right  weakness.  Photophobia noted.  CT head negative for any acute findings.  CTP with normal brain perfusion.  CTA H/N with no filling stenosis, occlusion, or aneurysm noted.  Migraine cocktail ordered.  MRI brain with and without contrast ordered.    Last Known Normal Date/Time: 1000 EST     Review of Systems   Constitutional:  Negative for chills and fatigue.   HENT:  Negative for congestion and trouble swallowing.    Eyes:  Positive for photophobia. Negative for visual disturbance.   Respiratory:  Negative for cough and shortness of breath.    Cardiovascular:  Negative for chest pain and palpitations.   Gastrointestinal:  Positive for nausea. Negative for vomiting.   Musculoskeletal:  Negative for gait problem.   Neurological:  Positive for numbness and headaches. Negative for weakness.   Psychiatric/Behavioral:  Negative for confusion.       Past Medical History:   Diagnosis Date    Cholelithiasis     Gastritis     Gastroparesis     Migraine     Ulcer (CMS/HCC)      Past Surgical  History:   Procedure Laterality Date     SECTION      CHOLECYSTECTOMY      ENDOSCOPY N/A 2018    Procedure: ESOPHAGOGASTRODUODENOSCOPY;  Surgeon: Mark I Brunner, MD;  Location: [facility] ENDOSCOPY;  Service: Gastroenterology    KNEE SURGERY Left     TONSILLECTOMY AND ADENOIDECTOMY      UPPER GASTROINTESTINAL ENDOSCOPY       Family History   Problem Relation Age of Onset    Multiple sclerosis Mother      Social History     Socioeconomic History    Marital status: Single   Tobacco Use    Smoking status: Never    Smokeless tobacco: Never   Substance and Sexual Activity    Alcohol use: Yes     Comment: socially    Drug use: No    Sexual activity: Defer     Allergies   Allergen Reactions    Sulfa Antibiotics Unknown - High Severity     FROM CHILDHOOD     Prior to Admission medications    Medication Sig Start Date End Date Taking? Authorizing Provider   Ginger, Zingiber officinalis, (GINGER ROOT) 500 MG capsule Take 550 mg by mouth 3 (Three) Times a Day. 18   Scott Jackson MD   hydrOXYzine (VISTARIL) 25 MG capsule Take 50 mg by mouth Every Night.    ProviderBrady MD   metoclopramide (REGLAN) 10 MG tablet Take 1 tablet by mouth 4 (Four) Times a Day. 18   Jorge A Acevedo MD   ondansetron (ZOFRAN) 4 MG tablet Take 1 tablet by mouth Every 6 (Six) Hours As Needed for Nausea or Vomiting. 18   Jorge A Acevedo MD   promethazine (PHENERGAN) 25 MG tablet Take 25 mg by mouth Every 8 (Eight) Hours As Needed for Nausea or Vomiting.    ProviderBrady MD   topiramate (TOPAMAX) 100 MG tablet Take 200 mg by mouth Every Night.    Brady Aburto MD             Objective     Temp:  [97.7 °F (36.5 °C)] 97.7 °F (36.5 °C)  Heart Rate:  [66] 66  Resp:  [16] 16  BP: (132)/(75) 132/75  Neurological Exam  Mental Status   Oriented to person, place, and time. Speech is normal. Language is fluent with no aphasia.    Cranial Nerves  CN II: Visual acuity is normal. Visual fields full to  confrontation.  CN III, IV, VI: Extraocular movements intact bilaterally. Normal lids and orbits bilaterally. Pupils equal round and reactive to light bilaterally.  CN V: Facial sensation is normal.  CN VII: Full and symmetric facial movement.  CN IX, X: Palate elevates symmetrically  CN XI: Shoulder shrug strength is normal.  CN XII: Tongue midline without atrophy or fasciculations.    Motor   Strength is 5/5 in all four extremities except as noted.  RUE  weaker than left, no limb drift.    Sensory  Light touch abnormality: Right facial numbness, RUE numbness.     Reflexes  Deep tendon reflexes are 2+ and symmetric in all four extremities.    Coordination  Right: Finger-to-nose normal.Left: Finger-to-nose normal.    Gait  Casual gait is normal including stance, stride, and arm swing.      Physical Exam  Constitutional:       General: She is in acute distress.   HENT:      Head: Normocephalic and atraumatic.   Eyes:      General: Lids are normal.      Extraocular Movements: Extraocular movements intact.      Pupils: Pupils are equal, round, and reactive to light.   Cardiovascular:      Rate and Rhythm: Normal rate and regular rhythm.   Pulmonary:      Effort: Pulmonary effort is normal. No respiratory distress.   Abdominal:      General: There is no distension.      Palpations: Abdomen is soft.   Musculoskeletal:         General: Normal range of motion.      Cervical back: Normal range of motion. No rigidity.   Skin:     General: Skin is warm and dry.      Capillary Refill: Capillary refill takes less than 2 seconds.   Neurological:      Mental Status: She is oriented to person, place, and time.      Sensory: Sensory deficit present.      Deep Tendon Reflexes: Reflexes are normal and symmetric.   Psychiatric:         Speech: Speech normal.         Acute Stroke Data    IV Thrombolytic (TPA/Tenecteplase) Inclusion / Exclusion Criteria    Time: 11:19 EST  Person Administering Scale: Cindy Bruce,  APRN    Inclusion Criteria  [x]   18 years of age or greater   []   Onset of symptoms < 4.5 hours before beginning treatment (stroke onset = time patient was last seen well or without symptoms).   []   Diagnosis of acute ischemic stroke causing measurable disabling deficit (Complete Hemianopia, Any Aphasia, Visual or Sensory Extinction, Any weakness limiting sustained effort against gravity)   []   Any remaining deficit considered potentially disabling in view of patient and practitioner   Exclusion criteria (Do not proceed with Alteplase if any are checked under exclusion criteria)  []   Onset unknown or GREATER than 4.5 hours   []   ICH on CT/MRI   []   CT demonstrates hypodensity representing acute or subacute infarct   []   Significant head trauma or prior stroke in the previous 3 months   []   Symptoms suggestive of subarachnoid hemorrhage   []   History of un-ruptured intracranial aneurysm GREATER than 10 mm   []   Recent intracranial or intraspinal surgery within the last 3 months   []   Arterial puncture at a non-compressible site in the previous 7 days   []   Active internal bleeding   []   Acute bleeding tendency   []   Platelet count LESS than 100,000 for known hematological diseases such as leukemia, thrombocytopenia or chronic cirrhosis   []   Current use of anticoagulant with INR GREATER than 1.7 or PT GREATER than 15 seconds, aPTT GREATER than 40 seconds   []   Heparin received within 48 hours, resulting in abnormally elevated aPTT GREATER than upper limit of normal   []   Current use of direct thrombin inhibitors or direct factor Xa inhibitors in the past 48 hours   []   Elevated blood pressure refractory to treatment (systolic GREATER than 185 mm/Hg or diastolic  GREATER than 110 mm/Hg   []   Suspected infective endocarditis and aortic arch dissection   []   Current use of therapeutic treatment dose of low-molecular-weight heparin (LMWH) within the previous 24 hours   []   Structural GI malignancy  or bleed   Relative exclusion for all patients  [x]   Only minor nondisabling symptoms   []   Pregnancy   []   Seizure at onset with postictal residual neurological impairments   []   Major surgery or previous trauma within past 14 days   []   History of previous spontaneous ICH, intracranial neoplasm, or AV malformation   []   Postpartum (within previous 14 days)   []   Recent GI or urinary tract hemorrhage (within previous 21 days)   []   Recent acute MI (within previous 3 months)   []   History of unruptured intracranial aneurysm LESS than 10 mm   []   History of ruptured intracranial aneurysm   []   Blood glucose LESS than 50 mg/dL (2.7 mmol/L)   []   Dural puncture within the last 7 days   []   Known GREATER than 10 cerebral microbleeds   Additional exclusions for patients with symptoms onset between 3 and 4.5 hours.  []   Age > 80.   []   On any anticoagulants regardless of INR  >>> Warfarin (Coumadin), Heparin, Enoxaparin (Lovenox), fondaparinux (Arixtra), bivalirudin (Angiomax), Argatroban, dabigatran (Pradaxa), rivaroxaban (Xarelto), or apixaban (Eliquis)   []   Severe stroke (NIHSS > 25).   []   History of BOTH diabetes and previous ischemic stroke.   []   The risks and benefits have been discussed with the patient or family related to the administration of IV alteplase for stroke symptoms.   []   I have discussed and reviewed the patient's case and imaging with the attending prior to IV Thrombolytic (TPA/Tenecteplase).    Time Thrombolytic administered       Hospital Meds:  Scheduled- diphenhydrAMINE, 25 mg, Intravenous, Once  ketorolac, 15 mg, Intravenous, Once  prochlorperazine, 10 mg, Intravenous, Once  sodium chloride, 500 mL, Intravenous, Once      Infusions-     PRNs-   sodium chloride    Functional Status Prior to Current Stroke/[place] Score: 0    NIH Stroke Scale  Time: 1105  Person Administering Scale: Cindy Bruce, SAVAGE    Interval: baseline  1a. Level of Consciousness: 0-->Alert, keenly  responsive  1b. LOC Questions: 0-->Answers both questions correctly  1c. LOC Commands: 0-->Performs both tasks correctly  2. Best Gaze: 0-->Normal  3. Visual: 0-->No visual loss  4. Facial Palsy: 1-->Minor paralysis (flattened nasolabial fold, asymmetry on smiling) (ON LEFT)  5a. Motor Arm, Left: 0-->No drift, limb holds 90 (or 45) degrees for full 10 secs  5b. Motor Arm, Right: 0-->No drift, limb holds 90 (or 45) degrees for full 10 secs  6a. Motor Leg, Left: 0-->No drift, leg holds 30 degree position for full 5 secs  6b. Motor Leg, Right: 0-->No drift, leg holds 30 degree position for full 5 secs  7. Limb Ataxia: 0-->Absent  8. Sensory: 1-->Mild-to-moderate sensory loss, patient feels pinprick is less sharp or is dull on the affected side, or there is a loss of superficial pain with pinprick, but patient is aware of being touched (RIGHT FACE, RUE)  9. Best Language: 0-->No aphasia, normal  10. Dysarthria: 0-->Normal  11. Extinction and Inattention (formerly Neglect): 0-->No abnormality    Total (NIH Stroke Scale): 2      Results Reviewed:  I have personally reviewed current lab, radiology, and data.  CT CEREBRAL PERFUSION WITH & WITHOUT CONTRAST    Result Date: 1/8/2024  1.No acute abnormality identified within the large arteries of the head or neck. 2.No significant stenosis of the bilateral internal carotid arteries. 3.CT perfusion study demonstrates no territorial ischemia or core infarct. Electronically Signed: Ben Vasquez MD  1/8/2024 11:37 AM EST  Workstation ID: [id number]    CT Angiogram Head w AI Analysis of LVO    Result Date: 1/8/2024  1.No acute abnormality identified within the large arteries of the head or neck. 2.No significant stenosis of the bilateral internal carotid arteries. 3.CT perfusion study demonstrates no territorial ischemia or core infarct. Electronically Signed: Ben Vasquez MD  1/8/2024 11:37 AM EST  Workstation ID: [id number]    CT Angiogram Neck    Result Date: 1/8/2024  1.No  acute abnormality identified within the large arteries of the head or neck. 2.No significant stenosis of the bilateral internal carotid arteries. 3.CT perfusion study demonstrates no territorial ischemia or core infarct. Electronically Signed: Ben Vasquez MD  1/8/2024 11:37 AM EST  Workstation ID: [id number]    CT Head Without Contrast Stroke Protocol    Result Date: 1/8/2024  Impression: No evidence of acute intracranial abnormality. Electronically Signed: Ang Dunham MD  1/8/2024 11:16 AM EST  Workstation ID: [id number]           Assessment/Plan:  43-year-old female with a PMH significant for migraine (on Topamax, Imitrex), anxiety, insomnia, and gastroparesis who presents to [facility] ED with complaints of headache and left-sided numbness.  Patient reports that she had a migraine that started on Friday.  It has persisted since that time.  This a.m. at approximately 10:00, she noted a continued headache and new left-sided numbness.  She came to [facility] ED for further evaluation.  She is not a candidate for TNK due to minor, nondisabling symptoms.  She is not a candidate for neurointervention as no LVO was noted on advanced imaging.    Headache, right-sided numbness  -Suspected complex migraine  -MRI brain with and without contrast pending  -If MRI is positive for AIS, stroke order sets will be initiated  -Recommend outpatient follow-up with Dr. Mcelroy of general neurology and headache clinic.  -Migraine cocktail ordered in the ED.  -Stroke neurology will follow MRI results.  -Plan discussed with Dr. Snow, the patient, nursing staff. If MRI is negative for AIS, we will sign off and defer disposition to the ED MD.  Please call with any questions or concerns.    SAVAGE Evans, AGACNP-BC  January 8, 2024  11:19 EST

## 2024-01-08 NOTE — ED NOTES
" Nadia Houston    Nursing Report ED to Floor:  Mental status: GCS 15  Ambulatory status: fully amb  Oxygen Therapy:  RA   Cardiac Rhythm: SR-ST  Admitted from: ED/home  Safety Concerns:  NA. MRI negative. MD admits for cluster migraine. Medicated @ 1425 for second time  Social Issues: NA  ED Room #:  29    ED Nurse Phone Extension - [phone] or may call [phone].      HPI:   Chief Complaint   Patient presents with    Headache    Numbness       Past Medical History:  Past Medical History:   Diagnosis Date    Cholelithiasis     Gastritis     Gastroparesis     Migraine     Ulcer         Past Surgical History:  Past Surgical History:   Procedure Laterality Date     SECTION      CHOLECYSTECTOMY      ENDOSCOPY N/A 2018    Procedure: ESOPHAGOGASTRODUODENOSCOPY;  Surgeon: Mark I Brunner, MD;  Location: [facility] ENDOSCOPY;  Service: Gastroenterology    KNEE SURGERY Left     TONSILLECTOMY AND ADENOIDECTOMY      UPPER GASTROINTESTINAL ENDOSCOPY          Admitting Doctor:   No admitting provider for patient encounter.    Consulting Provider(s):  Consults       No orders found from 12/10/2023 to 2024.             Admitting Diagnosis:   There were no encounter diagnoses.    Most Recent Vitals:   Vitals:    24 1052   BP: 132/75   BP Location: Left arm   Patient Position: Sitting   Pulse: 66   Resp: 16   Temp: 97.7 °F (36.5 °C)   TempSrc: Oral   SpO2: 100%   Weight: 68 kg (150 lb)   Height: 167.6 cm (66\")       Active LDAs/IV Access:   Lines, Drains & Airways       Active LDAs       Name Placement date Placement time Site Days    Peripheral IV 24 1118 Left Antecubital 24  1118  Antecubital  less than 1                    Labs (abnormal labs have a star):   Labs Reviewed   CBC WITH AUTO DIFFERENTIAL - Abnormal; Notable for the following components:       Result Value    Hemoglobin 11.5 (*)     MCH 25.2 (*)     MCHC 30.3 (*)     All other components within normal limits   COMPREHENSIVE METABOLIC PANEL - " Abnormal; Notable for the following components:    Chloride 111 (*)     CO2 20.0 (*)     All other components within normal limits    Narrative:     GFR Normal >60  Chronic Kidney Disease <60  Kidney Failure <15     POCT CHEM 8 - Abnormal; Notable for the following components:    Chloride 110 (*)     All other components within normal limits   POCT CHEM 8 - Abnormal; Notable for the following components:    Chloride 110 (*)     Total CO2 20 (*)     All other components within normal limits   POCT PROTIME - INR - Normal   POCT PROTIME - INR - Normal   RAINBOW DRAW    Narrative:     The following orders were created for panel order [place] Draw.  Procedure                               Abnormality         Status                     ---------                               -----------         ------                     Green Top (Gel)[[id number]]                                  Final result               Lavender Top[[id number]]                                     Final result               Gold Top - SST[[id number]]                                   Final result               Bajwa Top[[id number]]                                         In process                 Light Blue Top[[id number]]                                   Final result                 Please view results for these tests on the individual orders.   CBC AND DIFFERENTIAL    Narrative:     The following orders were created for panel order CBC & Differential.  Procedure                               Abnormality         Status                     ---------                               -----------         ------                     CBC Auto Differential[[id number]]        Abnormal            Final result                 Please view results for these tests on the individual orders.   GREEN TOP   LAVENDER TOP   GOLD TOP - SST   LIGHT BLUE TOP   GRAY TOP       Meds Given in ED:   Medications   sodium chloride 0.9 % flush 10 mL (has no administration in time range)    dexAMETHasone (DECADRON) IVPB 10 mg (has no administration in time range)   SUMAtriptan (IMITREX) injection 6 mg (has no administration in time range)   sodium chloride 0.9 % infusion (has no administration in time range)   sodium chloride 0.9 % bolus 1,000 mL (1,000 mL Intravenous New Bag 1/8/24 1423)   dexAMETHasone (DECADRON) injection 8 mg (has no administration in time range)   temazepam (RESTORIL) capsule 15 mg (has no administration in time range)   ketorolac (TORADOL) injection 30 mg (has no administration in time range)   sodium chloride 0.9 % bolus 500 mL (0 mL Intravenous Stopped 1/8/24 1423)   ketorolac (TORADOL) injection 15 mg (15 mg Intravenous Given 1/8/24 1158)   diphenhydrAMINE (BENADRYL) injection 25 mg (25 mg Intravenous Given 1/8/24 1158)   prochlorperazine (COMPAZINE) injection 10 mg (10 mg Intravenous Given 1/8/24 1159)   iopamidol (ISOVUE-370) 76 % injection 150 mL (115 mL Intravenous Given 1/8/24 1120)   gadobenate dimeglumine (MULTIHANCE) injection 14 mL (14 mL Intravenous Given 1/8/24 1228)     sodium chloride, 125 mL/hr

## 2024-01-08 NOTE — DISCHARGE SUMMARY
[facility] Medicine Services  ELOPEMENT AGAINST MEDICAL ADVICE    Patient Name: Nadia Houston  : 1980  MRN: [id number]    Date of Admission: 2024  Date of Elopement:  2024  Primary Care Physician: Provider, No Known    Consults       No orders found from 12/10/2023 to 2024.          Hospital Course     Presenting Problem:   Migraine [G43.909]    Active Hospital Problems    Diagnosis  POA    **Intractable headache [R51.9]  Yes    Gastroparesis [K31.84]  Yes      Resolved Hospital Problems   No resolved problems to display.          Hospital Course:  Nadia Houston is a 43 y.o. female admitted earlier this afternoon for intractable headache. Admission orders were placed however she left AMA prior to leaving the ED. I was notified after the patient had already left and I was not able to discuss her plans to leave AMA prior to her leaving.    **Patient left AMA prior to completion of evaluation and management**      Day of Discharge     HPI:   **Patient left AMA prior to completion of evaluation and management**    Vital Signs:   Temp:  [97.7 °F (36.5 °C)] 97.7 °F (36.5 °C)  Heart Rate:  [43-66] 53  Resp:  [16] 16  BP: (132-143)/(75-80) 143/80     Physical Exam (if applicable):  N/A    Discharge Details     Discharge Disposition:  **Patient left AMA prior to completion of evaluation and management, therefore discharge planning remains incomplete including absence of any needed discharging medications, testing arrangements or follow up unless otherwise specified**    No future appointments.          Domo Gallo DO  24

## 2024-01-09 NOTE — ED PROVIDER NOTES
"Subjective   History of Present Illness  43-year-old female presents for evaluation of headache and paresthesias.  Of note, the patient has a history of migraines.  She is followed by a neurologist at the [facility].  She tells me that for the past 4 days or so she has been experiencing a persistent and nagging migraine headache that she currently rates at 9 out of 10 in severity.  This morning, approximately an hour before coming to the emergency department, she began experiencing accompanying right-sided paresthesias.  She notes numbness and tingling to the right side of her face as well as to her right upper extremity.  She notes that her right arm feels \"weak\" when compared to her baseline as well.  As a result of her ongoing symptoms, she came here to the emergency department to be evaluated.  She denies any thunderclap onset to her headache.  She denies any neck pain or stiffness.  She has been taking her prescribed migraine medications at home without any significant relief in her symptoms.      Review of Systems   Neurological:  Positive for weakness, numbness and headaches.   All other systems reviewed and are negative.      Past Medical History:   Diagnosis Date    Cholelithiasis     Gastritis     Gastroparesis     Migraine     Ulcer        Allergies   Allergen Reactions    Sulfa Antibiotics Unknown - High Severity     FROM CHILDHOOD       Past Surgical History:   Procedure Laterality Date     SECTION      CHOLECYSTECTOMY      ENDOSCOPY N/A 2018    Procedure: ESOPHAGOGASTRODUODENOSCOPY;  Surgeon: Mark I Brunner, MD;  Location: [facility] ENDOSCOPY;  Service: Gastroenterology    KNEE SURGERY Left     TONSILLECTOMY AND ADENOIDECTOMY      UPPER GASTROINTESTINAL ENDOSCOPY         Family History   Problem Relation Age of Onset    Multiple sclerosis Mother        Social History     Socioeconomic History    Marital status: Single   Tobacco Use    Smoking status: Never    Smokeless tobacco: Never "   Substance and Sexual Activity    Alcohol use: Yes     Comment: socially    Drug use: No    Sexual activity: Defer           Objective   Physical Exam  Vitals and nursing note reviewed.   Constitutional:       Appearance: She is well-developed. She is not diaphoretic.      Comments: Nontoxic-appearing female   HENT:      Head: Normocephalic and atraumatic.   Eyes:      Pupils: Pupils are equal, round, and reactive to light.      Comments: Positive photophobia   Neck:      Comments: No meningeal signs or nuchal rigidity noted  Cardiovascular:      Rate and Rhythm: Normal rate and regular rhythm.      Heart sounds: Normal heart sounds. No murmur heard.     No friction rub. No gallop.   Pulmonary:      Effort: Pulmonary effort is normal. No respiratory distress.      Breath sounds: Normal breath sounds. No wheezing or rales.   Abdominal:      General: Bowel sounds are normal. There is no distension.      Palpations: Abdomen is soft. There is no mass.      Tenderness: There is no abdominal tenderness. There is no guarding or rebound.   Musculoskeletal:         General: Normal range of motion.      Cervical back: Neck supple.   Skin:     General: Skin is warm and dry.      Findings: No erythema or rash.   Neurological:      Mental Status: She is alert and oriented to person, place, and time.      Comments: Awake, alert, and oriented x3, clear and fluent speech, no dysmetria noted, decree sensation noted to right arm when compared to the left, decreased  strength noted to right hand when compared to the left   Psychiatric:         Mood and Affect: Mood normal.         Thought Content: Thought content normal.         Judgment: Judgment normal.         Procedures           ED Course  ED Course as of 01/08/24 2029 Mon Jan 08, 2024   1118 43-year-old female presents for evaluation of headache and paresthesias.  Of note, the patient has a history of migraines.  She tells me that for the past 4 days she has been  experiencing a persistent and nagging migraine headache that she currently rates at 9 out of 10 in severity.  This morning, approximately an hour before coming to the emergency department, she began experiencing accompanying right-sided paresthesias including numbness and tingling to her right face and right upper extremity.  As a result of her ongoing symptoms, she came here to the ED to be evaluated.  I was asked to come and evaluate the patient in triage.  On exam, the patient has decreased sensation to her right face as well as to her right arm when compared to the contralateral side.  Her  strength in her right hand is decreased as well.  As a result of the patient's symptoms, code stroke was initiated.  She was taken to CT where I was met by our stroke navigator. [DD]   1119 I personally and independently reviewed the patient's CT images and findings, and I am in agreement with the radiologist regarding CT interpretation--particularly, there is no intracranial hemorrhage noted. [DD]   1119 The patient has been symptomatic now for 4 days.  Therefore, she is clearly outside of the window for TNK.  Differential diagnosis is quite broad.  We will obtain labs and further advanced imaging and will reassess following initial interventions.  We will treat [DD]   1120  with IV fluids and medications for a potential atypical migraine in the interim. [DD]   1141 Initial advanced imaging is negative for any intracranial vascular lesions amenable to neurointervention. [DD]   1150 Labs are bland/unrevealing. [DD]   1301 Upon reevaluation, the patient continues to be quite symptomatic.  Her headache is not significantly improved.  Patient given Decadron and Imitrex.  I have reached out to April Deyvi with our neurology team who will come to the emergency department to personally evaluate the patient. [DD]   1349 The patient was evaluated here in the emergency department by Dr. López of neurology.  He is recommending  admission to the hospitalist service with neurology consulting.  I am in agreement.  I discussed the patient's case with our hospitalist, Dr. Jackson, and the patient will be admitted under his care for further evaluation and treatment.  The patient is hemodynamically stable at this time and is aware/agreeable with the plan. [DD]      ED Course User Index  [DD] Dami Snow MD                          Total (NIH Stroke Scale): 2                Recent Results (from the past 24 hour(s))   Green Top (Gel)    Collection Time: 01/08/24 11:18 AM   Result Value Ref Range    Extra Tube Hold for add-ons.    Lavender Top    Collection Time: 01/08/24 11:18 AM   Result Value Ref Range    Extra Tube hold for add-on    Gold Top - SST    Collection Time: 01/08/24 11:18 AM   Result Value Ref Range    Extra Tube Hold for add-ons.    Gray Top    Collection Time: 01/08/24 11:18 AM   Result Value Ref Range    Extra Tube Hold for add-ons.    Light Blue Top    Collection Time: 01/08/24 11:18 AM   Result Value Ref Range    Extra Tube Hold for add-ons.    CBC Auto Differential    Collection Time: 01/08/24 11:18 AM    Specimen: Blood   Result Value Ref Range    WBC 7.84 3.40 - 10.80 10*3/mm3    RBC 4.56 3.77 - 5.28 10*6/mm3    Hemoglobin 11.5 (L) 12.0 - 15.9 g/dL    Hematocrit 38.0 34.0 - 46.6 %    MCV 83.3 79.0 - 97.0 fL    MCH 25.2 (L) 26.6 - 33.0 pg    MCHC 30.3 (L) 31.5 - 35.7 g/dL    RDW 15.3 12.3 - 15.4 %    RDW-SD 46.4 37.0 - 54.0 fl    MPV 10.7 6.0 - 12.0 fL    Platelets 419 140 - 450 10*3/mm3    Neutrophil % 66.1 42.7 - 76.0 %    Lymphocyte % 26.1 19.6 - 45.3 %    Monocyte % 5.9 5.0 - 12.0 %    Eosinophil % 1.0 0.3 - 6.2 %    Basophil % 0.6 0.0 - 1.5 %    Immature Grans % 0.3 0.0 - 0.5 %    Neutrophils, Absolute 5.18 1.70 - 7.00 10*3/mm3    Lymphocytes, Absolute 2.05 0.70 - 3.10 10*3/mm3    Monocytes, Absolute 0.46 0.10 - 0.90 10*3/mm3    Eosinophils, Absolute 0.08 0.00 - 0.40 10*3/mm3    Basophils, Absolute 0.05 0.00 - 0.20  10*3/mm3    Immature Grans, Absolute 0.02 0.00 - 0.05 10*3/mm3    nRBC 0.0 0.0 - 0.2 /100 WBC   Comprehensive Metabolic Panel    Collection Time: 01/08/24 11:18 AM    Specimen: Blood   Result Value Ref Range    Glucose 97 65 - 99 mg/dL    BUN 12 6 - 20 mg/dL    Creatinine 0.84 0.57 - 1.00 mg/dL    Sodium 142 136 - 145 mmol/L    Potassium 3.7 3.5 - 5.2 mmol/L    Chloride 111 (H) 98 - 107 mmol/L    CO2 20.0 (L) 22.0 - 29.0 mmol/L    Calcium 8.7 8.6 - 10.5 mg/dL    Total Protein 7.3 6.0 - 8.5 g/dL    Albumin 4.2 3.5 - 5.2 g/dL    ALT (SGPT) 11 1 - 33 U/L    AST (SGOT) 18 1 - 32 U/L    Alkaline Phosphatase 75 39 - 117 U/L    Total Bilirubin 0.4 0.0 - 1.2 mg/dL    Globulin 3.1 gm/dL    A/G Ratio 1.4 g/dL    BUN/Creatinine Ratio 14.3 7.0 - 25.0    Anion Gap 11.0 5.0 - 15.0 mmol/L    eGFR 89.7 >60.0 mL/min/1.73   POC CHEM 8    Collection Time: 01/08/24 11:19 AM    Specimen: Blood   Result Value Ref Range    Glucose 96 70 - 130 mg/dL    BUN 13 8 - 26 mg/dL    Creatinine 0.80 0.60 - 1.30 mg/dL    Sodium 143 138 - 146 mmol/L    POC Potassium 3.7 3.5 - 4.9 mmol/L    Chloride 110 (H) 98 - 109 mmol/L    Total CO2 20 (L) 24 - 29 mmol/L    Hemoglobin 12.9 12.0 - 17.0 g/dL    Hematocrit 38 38 - 51 %    Ionized Calcium 1.27 1.20 - 1.32 mmol/L    eGFR 95.1 >60.0 mL/min/1.73   POC Protime / INR    Collection Time: 01/08/24 11:20 AM    Specimen: Blood   Result Value Ref Range    Protime 13.3 12.8 - 15.2 seconds    INR 1.1 0.8 - 1.2   POC CHEM 8    Collection Time: 01/08/24 11:38 AM    Specimen: Blood   Result Value Ref Range    Sodium 143 138 - 146 mmol/L    POC Potassium 3.7 3.5 - 4.9 mmol/L    Chloride 110 (A) 98 - 109 mmol/L    Calcium, Arterial 1.27 mg/dL    Glucose 96 70 - 130 mg/dL    BUN 13 mg/dL    Creatinine 0.80 0.60 - 1.30 mg/dL    Hemoglobin 12.9 12.0 - 17.0 g/dL    Hematocrit 38 38 - 51 %   POC Protime / INR    Collection Time: 01/08/24 11:39 AM    Specimen: Blood   Result Value Ref Range    Protime 13.3 seconds    INR 1  0.9 - 1.1   ECG 12 Lead Stroke Evaluation    Collection Time: 01/08/24 12:02 PM   Result Value Ref Range    QT Interval 430 ms    QTC Interval 407 ms     Note: In addition to lab results from this visit, the labs listed above may include labs taken at another facility or during a different encounter within the last 24 hours. Please correlate lab times with ED admission and discharge times for further clarification of the services performed during this visit.    MRI Brain With & Without Contrast   Final Result   Impression:   Normal contrast-enhanced MRI of the brain as above.            Electronically Signed: Humberto Shine MD     1/8/2024 12:47 PM EST     Workstation ID: [id number]      XR Chest 1 View   Final Result   Impression:   No acute process.         Electronically Signed: Lissa Martinez MD     1/8/2024 12:02 PM EST     Workstation ID: [id number]      CT CEREBRAL PERFUSION WITH & WITHOUT CONTRAST   Final Result   1.No acute abnormality identified within the large arteries of the head or neck.   2.No significant stenosis of the bilateral internal carotid arteries.   3.CT perfusion study demonstrates no territorial ischemia or core infarct.         Electronically Signed: Ben Vasquez MD     1/8/2024 11:37 AM EST     Workstation ID: [id number]      CT Angiogram Head w AI Analysis of LVO   Final Result   1.No acute abnormality identified within the large arteries of the head or neck.   2.No significant stenosis of the bilateral internal carotid arteries.   3.CT perfusion study demonstrates no territorial ischemia or core infarct.         Electronically Signed: Ben Vasquez MD     1/8/2024 11:37 AM EST     Workstation ID: [id number]      CT Angiogram Neck   Final Result   1.No acute abnormality identified within the large arteries of the head or neck.   2.No significant stenosis of the bilateral internal carotid arteries.   3.CT perfusion study demonstrates no territorial ischemia or core infarct.        "  Electronically Signed: Ben Vasquez MD     1/8/2024 11:37 AM EST     Workstation ID: [id number]      CT Head Without Contrast Stroke Protocol   Final Result   Impression:      No evidence of acute intracranial abnormality.         Electronically Signed: Ang Dunham MD     1/8/2024 11:16 AM EST     Workstation ID: [id number]        Vitals:    01/08/24 1052 01/08/24 1423 01/08/24 1545   BP: 132/75 143/80    BP Location: Left arm     Patient Position: Sitting     Pulse: 66 (!) 43 53   Resp: 16     Temp: 97.7 °F (36.5 °C)     TempSrc: Oral     SpO2: 100%  100%   Weight: 68 kg (150 lb)     Height: 167.6 cm (66\")       Medications   sodium chloride 0.9 % bolus 500 mL (0 mL Intravenous Stopped 1/8/24 1423)   ketorolac (TORADOL) injection 15 mg (15 mg Intravenous Given 1/8/24 1158)   diphenhydrAMINE (BENADRYL) injection 25 mg (25 mg Intravenous Given 1/8/24 1158)   prochlorperazine (COMPAZINE) injection 10 mg (10 mg Intravenous Given 1/8/24 1159)   iopamidol (ISOVUE-370) 76 % injection 150 mL (115 mL Intravenous Given 1/8/24 1120)   gadobenate dimeglumine (MULTIHANCE) injection 14 mL (14 mL Intravenous Given 1/8/24 1228)   dexAMETHasone (DECADRON) IVPB 10 mg (0 mg Intravenous Stopped 1/8/24 1450)   SUMAtriptan (IMITREX) injection 6 mg (6 mg Subcutaneous Given 1/8/24 1423)   sodium chloride 0.9 % bolus 1,000 mL (0 mL Intravenous Stopped 1/8/24 1554)     ECG/EMG Results (last 24 hours)       Procedure Component Value Units Date/Time    ECG 12 Lead Stroke Evaluation [[id number]] Collected: 01/08/24 1202     Updated: 01/08/24 1533     QT Interval 430 ms      QTC Interval 407 ms     Narrative:      Test Reason : Stroke Evaluation  Blood Pressure :   */*   mmHG  Vent. Rate :  54 BPM     Atrial Rate :  54 BPM     P-R Int : 136 ms          QRS Dur :  78 ms      QT Int : 430 ms       P-R-T Axes :   8  43  44 degrees     QTc Int : 407 ms    Sinus bradycardia  Otherwise normal ECG  No previous ECGs available  Confirmed by MD Edy, " Sudeep ([id number]) on 1/8/2024 3:32:56 PM    Referred By: RANJANA           Confirmed By: Sudeep Snow MD          ECG 12 Lead Stroke Evaluation   Final Result   Test Reason : Stroke Evaluation   Blood Pressure :   */*   mmHG   Vent. Rate :  54 BPM     Atrial Rate :  54 BPM      P-R Int : 136 ms          QRS Dur :  78 ms       QT Int : 430 ms       P-R-T Axes :   8  43  44 degrees      QTc Int : 407 ms      Sinus bradycardia   Otherwise normal ECG   No previous ECGs available   Confirmed by MD Snow Michael ([id number]) on 1/8/2024 3:32:56 PM      Referred By: RANJANA           Confirmed By: Sudeep Snow MD              Medical Decision Making  Amount and/or Complexity of Data Reviewed  Labs: ordered.  Radiology: ordered.  ECG/medicine tests: ordered.    Risk  Prescription drug management.        Final diagnoses:   Other migraine with status migrainosus, intractable       ED Disposition  ED Disposition       ED Disposition   AMA    Condition   --    Comment   Level of Care: Telemetry [5]  Diagnosis: Migraine [571731]                 No follow-up provider specified.       Medication List      No changes were made to your prescriptions during this visit.            Dami Snow MD  01/08/24 2032

## (undated) DEVICE — CANN NASL CO2 DIVIDED A/

## (undated) DEVICE — SINGLE-USE BIOPSY FORCEPS: Brand: RADIAL JAW 4

## (undated) DEVICE — THE BITE BLOCK MAXI, LATEX FREE STRAP IS USED TO PROTECT THE ENDOSCOPE INSERTION TUBE FROM BEING BITTEN BY THE PATIENT.